# Patient Record
Sex: MALE | Race: WHITE | Employment: OTHER | URBAN - METROPOLITAN AREA
[De-identification: names, ages, dates, MRNs, and addresses within clinical notes are randomized per-mention and may not be internally consistent; named-entity substitution may affect disease eponyms.]

---

## 2022-01-28 LAB — SARS-COV-2: DETECTED

## 2022-01-29 ENCOUNTER — HOSPITAL ENCOUNTER (INPATIENT)
Age: 72
LOS: 10 days | Discharge: HOME | DRG: 871 | End: 2022-02-08
Attending: INTERNAL MEDICINE | Admitting: INTERNAL MEDICINE
Payer: MEDICARE

## 2022-01-29 PROBLEM — J12.82 PNEUMONIA DUE TO COVID-19 VIRUS: Status: ACTIVE | Noted: 2022-01-29

## 2022-01-29 PROBLEM — U07.1 PNEUMONIA DUE TO COVID-19 VIRUS: Status: ACTIVE | Noted: 2022-01-29

## 2022-01-29 LAB
ANION GAP SERPL CALCULATED.3IONS-SCNC: 13 MMOL/L (ref 3–16)
BASE EXCESS ARTERIAL: 0.9 MMOL/L (ref -3–3)
BUN BLDV-MCNC: 46 MG/DL (ref 7–20)
C-REACTIVE PROTEIN: 455.9 MG/L (ref 0–5.1)
CALCIUM SERPL-MCNC: 8.7 MG/DL (ref 8.3–10.6)
CARBOXYHEMOGLOBIN ARTERIAL: 0.3 % (ref 0–1.5)
CHLORIDE BLD-SCNC: 99 MMOL/L (ref 99–110)
CO2: 25 MMOL/L (ref 21–32)
CREAT SERPL-MCNC: 1.2 MG/DL (ref 0.8–1.3)
D DIMER: 1642 NG/ML DDU (ref 0–229)
GFR AFRICAN AMERICAN: >60
GFR NON-AFRICAN AMERICAN: 59
GLUCOSE BLD-MCNC: 123 MG/DL (ref 70–99)
HCO3 ARTERIAL: 26.9 MMOL/L (ref 21–29)
HCT VFR BLD CALC: 38.8 % (ref 40.5–52.5)
HEMOGLOBIN, ART, EXTENDED: 13.5 G/DL (ref 13.5–17.5)
HEMOGLOBIN: 12.9 G/DL (ref 13.5–17.5)
MCH RBC QN AUTO: 31.2 PG (ref 26–34)
MCHC RBC AUTO-ENTMCNC: 33.2 G/DL (ref 31–36)
MCV RBC AUTO: 93.9 FL (ref 80–100)
METHEMOGLOBIN ARTERIAL: 0.4 %
O2 SAT, ARTERIAL: 96.9 %
O2 THERAPY: ABNORMAL
PCO2 ARTERIAL: 46.8 MMHG (ref 35–45)
PDW BLD-RTO: 14.6 % (ref 12.4–15.4)
PH ARTERIAL: 7.37 (ref 7.35–7.45)
PLATELET # BLD: 150 K/UL (ref 135–450)
PMV BLD AUTO: 10.8 FL (ref 5–10.5)
PO2 ARTERIAL: 92 MMHG (ref 75–108)
POTASSIUM SERPL-SCNC: 4.2 MMOL/L (ref 3.5–5.1)
PROCALCITONIN: 9.8 NG/ML (ref 0–0.15)
RBC # BLD: 4.14 M/UL (ref 4.2–5.9)
SODIUM BLD-SCNC: 137 MMOL/L (ref 136–145)
TCO2 ARTERIAL: 63.4 MMOL/L
TROPONIN: <0.01 NG/ML
TROPONIN: <0.01 NG/ML
WBC # BLD: 13.9 K/UL (ref 4–11)

## 2022-01-29 PROCEDURE — 86140 C-REACTIVE PROTEIN: CPT

## 2022-01-29 PROCEDURE — 94761 N-INVAS EAR/PLS OXIMETRY MLT: CPT

## 2022-01-29 PROCEDURE — 36415 COLL VENOUS BLD VENIPUNCTURE: CPT

## 2022-01-29 PROCEDURE — 84484 ASSAY OF TROPONIN QUANT: CPT

## 2022-01-29 PROCEDURE — 85027 COMPLETE CBC AUTOMATED: CPT

## 2022-01-29 PROCEDURE — 99223 1ST HOSP IP/OBS HIGH 75: CPT | Performed by: INTERNAL MEDICINE

## 2022-01-29 PROCEDURE — XW033H5 INTRODUCTION OF TOCILIZUMAB INTO PERIPHERAL VEIN, PERCUTANEOUS APPROACH, NEW TECHNOLOGY GROUP 5: ICD-10-PCS | Performed by: INTERNAL MEDICINE

## 2022-01-29 PROCEDURE — 80048 BASIC METABOLIC PNL TOTAL CA: CPT

## 2022-01-29 PROCEDURE — 2700000000 HC OXYGEN THERAPY PER DAY

## 2022-01-29 PROCEDURE — 85379 FIBRIN DEGRADATION QUANT: CPT

## 2022-01-29 PROCEDURE — 6360000002 HC RX W HCPCS: Performed by: INTERNAL MEDICINE

## 2022-01-29 PROCEDURE — 94640 AIRWAY INHALATION TREATMENT: CPT

## 2022-01-29 PROCEDURE — 84145 PROCALCITONIN (PCT): CPT

## 2022-01-29 PROCEDURE — 2580000003 HC RX 258: Performed by: INTERNAL MEDICINE

## 2022-01-29 PROCEDURE — 2000000000 HC ICU R&B

## 2022-01-29 PROCEDURE — 2500000003 HC RX 250 WO HCPCS: Performed by: INTERNAL MEDICINE

## 2022-01-29 PROCEDURE — 82803 BLOOD GASES ANY COMBINATION: CPT

## 2022-01-29 PROCEDURE — 6370000000 HC RX 637 (ALT 250 FOR IP): Performed by: INTERNAL MEDICINE

## 2022-01-29 PROCEDURE — 51702 INSERT TEMP BLADDER CATH: CPT

## 2022-01-29 RX ORDER — ACETAMINOPHEN 325 MG/1
650 TABLET ORAL EVERY 6 HOURS PRN
Status: DISCONTINUED | OUTPATIENT
Start: 2022-01-29 | End: 2022-02-08 | Stop reason: HOSPADM

## 2022-01-29 RX ORDER — ONDANSETRON 4 MG/1
4 TABLET, ORALLY DISINTEGRATING ORAL EVERY 8 HOURS PRN
Status: DISCONTINUED | OUTPATIENT
Start: 2022-01-29 | End: 2022-02-08 | Stop reason: HOSPADM

## 2022-01-29 RX ORDER — ALBUTEROL SULFATE 2.5 MG/3ML
2.5 SOLUTION RESPIRATORY (INHALATION) EVERY 4 HOURS PRN
Status: DISCONTINUED | OUTPATIENT
Start: 2022-01-29 | End: 2022-02-08 | Stop reason: HOSPADM

## 2022-01-29 RX ORDER — ALBUTEROL SULFATE 2.5 MG/3ML
2.5 SOLUTION RESPIRATORY (INHALATION) EVERY 4 HOURS
Status: DISCONTINUED | OUTPATIENT
Start: 2022-01-29 | End: 2022-01-29

## 2022-01-29 RX ORDER — DEXMEDETOMIDINE HYDROCHLORIDE 4 UG/ML
.2-1.4 INJECTION, SOLUTION INTRAVENOUS CONTINUOUS
Status: DISCONTINUED | OUTPATIENT
Start: 2022-01-29 | End: 2022-02-05

## 2022-01-29 RX ORDER — SODIUM CHLORIDE 0.9 % (FLUSH) 0.9 %
5-40 SYRINGE (ML) INJECTION PRN
Status: DISCONTINUED | OUTPATIENT
Start: 2022-01-29 | End: 2022-02-08 | Stop reason: HOSPADM

## 2022-01-29 RX ORDER — POLYETHYLENE GLYCOL 3350 17 G/17G
17 POWDER, FOR SOLUTION ORAL DAILY PRN
Status: DISCONTINUED | OUTPATIENT
Start: 2022-01-29 | End: 2022-02-08 | Stop reason: HOSPADM

## 2022-01-29 RX ORDER — ONDANSETRON 2 MG/ML
4 INJECTION INTRAMUSCULAR; INTRAVENOUS EVERY 6 HOURS PRN
Status: DISCONTINUED | OUTPATIENT
Start: 2022-01-29 | End: 2022-02-08 | Stop reason: HOSPADM

## 2022-01-29 RX ORDER — ACETAMINOPHEN 650 MG/1
650 SUPPOSITORY RECTAL EVERY 6 HOURS PRN
Status: DISCONTINUED | OUTPATIENT
Start: 2022-01-29 | End: 2022-02-08 | Stop reason: HOSPADM

## 2022-01-29 RX ORDER — ALBUTEROL SULFATE 90 UG/1
2 AEROSOL, METERED RESPIRATORY (INHALATION) EVERY 4 HOURS
Status: DISCONTINUED | OUTPATIENT
Start: 2022-01-29 | End: 2022-01-29

## 2022-01-29 RX ORDER — SODIUM CHLORIDE 0.9 % (FLUSH) 0.9 %
5-40 SYRINGE (ML) INJECTION EVERY 12 HOURS SCHEDULED
Status: DISCONTINUED | OUTPATIENT
Start: 2022-01-29 | End: 2022-02-08 | Stop reason: HOSPADM

## 2022-01-29 RX ORDER — SODIUM CHLORIDE 9 MG/ML
25 INJECTION, SOLUTION INTRAVENOUS PRN
Status: DISCONTINUED | OUTPATIENT
Start: 2022-01-29 | End: 2022-02-08 | Stop reason: HOSPADM

## 2022-01-29 RX ORDER — ALBUTEROL SULFATE 90 UG/1
2 AEROSOL, METERED RESPIRATORY (INHALATION) 4 TIMES DAILY
Status: DISCONTINUED | OUTPATIENT
Start: 2022-01-30 | End: 2022-02-02

## 2022-01-29 RX ADMIN — TOCILIZUMAB 800 MG: 20 INJECTION, SOLUTION, CONCENTRATE INTRAVENOUS at 17:00

## 2022-01-29 RX ADMIN — Medication 2 PUFF: at 16:15

## 2022-01-29 RX ADMIN — DEXAMETHASONE SODIUM PHOSPHATE 20 MG: 4 INJECTION, SOLUTION INTRA-ARTICULAR; INTRALESIONAL; INTRAMUSCULAR; INTRAVENOUS; SOFT TISSUE at 12:38

## 2022-01-29 RX ADMIN — Medication 10 ML: at 20:19

## 2022-01-29 RX ADMIN — SODIUM CHLORIDE 25 ML: 9 INJECTION, SOLUTION INTRAVENOUS at 12:33

## 2022-01-29 RX ADMIN — DEXMEDETOMIDINE HYDROCHLORIDE 0.2 MCG/KG/HR: 4 INJECTION, SOLUTION INTRAVENOUS at 20:16

## 2022-01-29 RX ADMIN — ENOXAPARIN SODIUM 100 MG: 100 INJECTION SUBCUTANEOUS at 16:41

## 2022-01-29 NOTE — PLAN OF CARE
Pending transfer note (patient coming from [facility] ER):    54-year-old male with history of COPD, morbid obesity due to excess calories, who presents to the emergency room with complaints of cough, shortness of breath, ongoing for the past 2 to 3 days. Patient tested positive for COVID-19 in the emergency room. Chest x-ray reveals bilateral infiltrates. He is hypoxic, currently requiring high flow 15 L/min supplemental oxygen. Per report obtained from the emergency room, patient did not tolerate BiPAP. His current saturation is around 90%. He has leukocytosis of 11,800, BUN of 40, creatinine 1.7. Initial troponin 0.044, repeat 0.02 (which is normal based on lab parameters at [facility]). Arterial blood gas with pH of 7.35, PCO2 45. In the emergency room, patient received a dose of IV Levaquin, IV Decadron, which was followed by IV Solu-Medrol. Patient is being transferred to [facility] for management of COVID-19 pneumonia, acute respiratory failure with hypoxia, acute kidney injury. Patient will be admitted to ICU. SIGNED: Kyle Martinez MD, MPH . 1/29/2022       Update:  D-dimer was elevated and 1 full dose SC lovenox given. Did not have CT-PE due to poor renal function.

## 2022-01-29 NOTE — CONSULTS
[facility] Pulmonary and Critical Care   Consult Note      Reason for Consult: Acute hypoxemic respiratory failure, COVID-19 pneumonia  Requesting Physician: Dr. Ramiro Thomas  Subjective:   [facility]:                The patient is a 66 y.o. male with significant past medical history of:  History reviewed. No pertinent past medical history. Patient transferred here from [facility] emergency room after presenting with a chief complaint of increasingly severe shortness of breath over the preceding 2 to 3 days. He also complained of moderate associated cough. Exertion is a modifying factor as his saturations fall even when he moves about in the bed. Patient is tested positive for COVID-19. Hypoxemic respiratory failure has progressed to the point that he is now on heated high flow oxygen. He was given Levaquin and Decadron in the emergency department prior to being transferred here. Past Surgical History:    History reviewed. No pertinent surgical history.   Current Medications:    Current Facility-Administered Medications: sodium chloride flush 0.9 % injection 5-40 mL, 5-40 mL, IntraVENous, 2 times per day  sodium chloride flush 0.9 % injection 5-40 mL, 5-40 mL, IntraVENous, PRN  0.9 % sodium chloride infusion, 25 mL, IntraVENous, PRN  enoxaparin (LOVENOX) injection 100 mg, 100 mg, SubCUTAneous, BID  ondansetron (ZOFRAN-ODT) disintegrating tablet 4 mg, 4 mg, Oral, Q8H PRN **OR** ondansetron (ZOFRAN) injection 4 mg, 4 mg, IntraVENous, Q6H PRN  polyethylene glycol (GLYCOLAX) packet 17 g, 17 g, Oral, Daily PRN  acetaminophen (TYLENOL) tablet 650 mg, 650 mg, Oral, Q6H PRN **OR** acetaminophen (TYLENOL) suppository 650 mg, 650 mg, Rectal, Q6H PRN  dexamethasone (DECADRON) 20 mg in sodium chloride 0.9 % IVPB, 20 mg, IntraVENous, Daily  albuterol sulfate  (90 Base) MCG/ACT inhaler 2 puff, 2 puff, Inhalation, Q4H  albuterol (PROVENTIL) nebulizer solution 2.5 mg, 2.5 mg, Nebulization, Q4H PRN    Not on File    Social History:    TOBACCO:   has no history on file for tobacco use. ETOH:   has no history on file for alcohol use. Patient currently lives independently  Environmental/chemical exposure: None known    Family History:   History reviewed. No pertinent family history. REVIEW OF SYSTEMS:    No review of systems due to patient factors    Objective:   PHYSICAL EXAM:      VITALS:  /76   Pulse 73 Comment: SR  Temp 97.2 °F (36.2 °C) (Temporal)   Resp 23   Ht 6' 2\" (1.88 m)   Wt 233 lb (105.7 kg)   BMI 29.92 kg/m²      24HR INTAKE/OUTPUT:    Intake/Output Summary (Last 24 hours) at 1/29/2022 1258  Last data filed at 1/29/2022 1214  Gross per 24 hour   Intake --   Output 650 ml   Net -650 ml     CONSTITUTIONAL: He is awake and cooperative but really not giving much in the way of historical detail   NECK:  Supple, symmetrical, trachea midline, no adenopathy, thyroid symmetric, not enlarged and no tenderness, skin normal  LUNGS:  no increased work of breathing and clear to auscultation. No accessory muscle use  CARDIOVASCULAR: S1 and S2, no edema and no JVD  ABDOMEN:  normal bowel sounds, non-distended and no masses palpated, and no tenderness to palpation. No hepatospleenomegaly  LYMPHADENOPATHY:  no axillary or supraclavicular adenopathy. No cervical adnenopathy  PSYCHIATRIC: Alert and oriented  MUSCULOSKELETAL: No obvious misalignment or effusion of the joints. No clubbing, cyanosis of the digits. SKIN:  normal skin color, texture, turgor and no redness, warmth, or swelling. No palpable nodules    DATA:    Old records have been reviewed    CBC:  No results for input(s): WBC, RBC, HGB, HCT, PLT, MCV, MCH, MCHC, RDW, NRBC, SEGSPCT, BANDSPCT in the last 72 hours. BMP:  No results for input(s): NA, K, CL, CO2, BUN, CREATININE, CALCIUM, GLUCOSE in the last 72 hours.    ABG:  Recent Labs     01/29/22  1138   PHART 7.367   BKN1PCV 46.8*   PO2ART 92.0   EKH0MBY 26.9   H7KPDLNI 96.9   BEART 0.9 Procalcitonin  Recent Labs     01/29/22  1159   PROCAL 9.80*       No results found for: BNP  Lab Results   Component Value Date    TROPONINI <0.01 01/29/2022           Radiology Review:  All pertinent images / reports were reviewed as a part of this visit. Assessment:     Acute hypoxemic respiratory failure  COVID-19 pneumonia    Plan:     Patient has just arrived and labs and x-rays from here are still pending. He is in mild respiratory distress on heated high flow oxygen 95% / 50 L/min  Saturations are in the high 80s and low 90s. He is at risk for progression to requiring endotracheal intubation and mechanical ventilation.   Put him on Decadron 20 mg daily  We will give him a dose of Actemra  Follow-up on lab

## 2022-01-29 NOTE — CARE COORDINATION
Discharge planning note:    Chart reviewed and it appears that patient has minimal needs for discharge at this time. Discussed with patients nurse and requested that case management be notified if discharge needs are identified. Patient was transferred from [facility] ER. He is from home with spouse. Currently requiring Airvo at 45% & IV Decadron    Financial counseling referral placed d/t no insurance listed. 7% Readmission risk. Case management will continue to follow progress and update discharge plan as needed.       Kelly Espinoza MSN, BSN, RN  Case Management   [phone]

## 2022-01-29 NOTE — H&P
Hospital Medicine History & Physical      PCP: Kem Tamez MD    Date of Admission: 1/29/2022    Date of Service: Pt seen/examined on 1/29/2022 and Admitted to Inpatient . Chief Complaint: Short of breath      History Of Present Illness:  Patient is a 71-year-old  male presents from [facility] urgency department. He presented to the emergency department with 2 to 3 days of worsening shortness of breath. Currently he was tested for Covid in the ER there and to test positive. Chest x-ray reveals bilateral infiltrates. He is hypoxic, was requiring high flow 15 L/min supplemental oxygen. Per report obtained from the emergency room, patient did not tolerate BiPAP. His current saturation is around 90%. At the time of my evaluation the patient is on air Vo as well as nonrebreather. Past Medical History:    Emphysema, extrinsic asthma, hypertension, esophageal reflux disease,  Past Surgical History:    Abdominal surgery, finger surgery    Medications Prior to Admission:    Theophylline 200 mg tablets take 100 mg by mouth daily Diovan 320 mg by mouth daily, Norvasc 5 mg by mouth daily, omeprazole 20 mg by mouth daily, Percocet 5/3/2025 1 tab every 4 hours as needed for pain prescribed and 2015 it was last prescribed in April 2021    Allergies:  Patient has no known drug allergies. Social History:  The patient currently lives at home    TOBACCO: Patient is an every day smoker and smokes 2 packs a day he has done so for 48 years  ETOH: Patient also does drink records indicate he drinks 12 standard drinks a week. Family History:  Reviewed in detail and negative for DM, Early CAD, Cancer, CVA. Positive as follows:    History reviewed. No pertinent family history.     REVIEW OF SYSTEMS:     The patient is encephalopathic and not able to answer questions appropriately at this time. PHYSICAL EXAM:    /69   Pulse 68   Temp 97.2 °F (36.2 °C) (Temporal)   Resp 20   Ht 6' 2\" (1.88 m)   Wt 233 lb (105.7 kg)   SpO2 92%   BMI 29.92 kg/m²     General appearance: He is in mild respiratory distress but appears comfortable on his air Vo as well as the nonrebreather. HEENT Normal cephalic, atraumatic without obvious deformity. Pupils equal, round, and reactive to light. Extra ocular muscles intact. Conjunctivae/corneas clear. Neck: Supple, No jugular venous distention/bruits. Trachea midline without thyromegaly or adenopathy with full range of motion. Lungs: He has rales at the bases bilaterally but is moving air okay no wheeze or rhonchi are auscultated. Heart: Regular rate and rhythm with Normal S1/S2 without murmurs, rubs or gallops, point of maximum impulse non-displaced  Abdomen: Soft, non-tender or non-distended without rigidity or guarding and positive bowel sounds all four quadrants. Extremities: No clubbing, cyanosis, or edema bilaterally. Full range of motion without deformity and normal gait intact. Skin: Skin color, texture, turgor normal.  No rashes or lesions. Neurologic: Alert and oriented X 3, neurovascularly intact with sensory/motor intact upper extremities/lower extremities, bilaterally. Cranial nerves: II-XII intact, grossly non-focal.  Mental status: Alert, oriented, thought content appropriate. Capillary Refill: Acceptable  < 3 seconds  Peripheral Pulses: +3 Easily felt, not easily obliterated with pressure      CXR:  I have reviewed the CXR with the following interpretation: Bilateral pneumonia based on his read from [facility] do not have any current x-rays on him here.   X-rays are ordered for tomorrow  EKG:  I have reviewed the EKG with the following interpretation: Normal sinus rhythm      Recent Labs     01/29/22  1159   TROPONINI <0.01     CRP is 455.9  His procalcitonin is 9.80   D-dimer is 1642  His blood gas reveals a pH of 7.367 with a PCO2 of 46.8 PO2 was 92.0 bicarb is 26.9 total CO2 was 63.4 base excess 0.9% saturation is 96.9  U/A:  No results found for: NITRITE, COLORU, WBCUA, RBCUA, MUCUS, BACTERIA, CLARITYU, SPECGRAV, LEUKOCYTESUR, BLOODU, GLUCOSEU, AMORPHOUS    ABG    Lab Results   Component Value Date    IZJ2IEL 26.9 01/29/2022    BEART 0.9 01/29/2022    Q7JYXCUA 96.9 01/29/2022    PHART 7.367 01/29/2022    DSD2RZE 46.8 01/29/2022    PO2ART 92.0 01/29/2022    XFP4QQC 63.4 01/29/2022           Active Hospital Problems    Diagnosis Date Noted    Pneumonia due to COVID-19 virus [U07.1, J12.82] 01/29/2022         PHYSICIANS CERTIFICATION:    I certify that Fransico Leal is expected to be hospitalized for greater  than 2 midnights based on the following assessment and plan:      ASSESSMENT/PLAN:    Acute respiratory failure  -Secondary to COVID-19 pneumonia as well as a component of COPD and asthma.  -Patient has nebulizers available he has been started on Decadron  -His CRP is elevated and he can likely receive a dose of Actemra versus starting on baricitinib. -As far as I can tell he is unvaccinated however he has not been able to answer my questions and I cannot find any record of his utilization in the chart. COVID-19 pneumonia  -Treatment as noted above  -Consider adding Actemra    Probable secondary infection.  -Patient's procalcitonin is 9.8 based on this we will cover him with empiric antibiotics  -I will start the patient on Zosyn    Tobacco abuse  -We will start the patient on a nicotine patch    Elevated D-dimer  -Patient will be on therapeutic Lovenox      DVT Prophylaxis: Lovenox  Diet: ADULT DIET; Regular  Code Status: Full Code  PT/OT Eval Status: Eval and treat when appropriate    Dispo -patient is critically ill with COVID-19 pneumonia he is currently requiring max air Vo as well as nonrebreather at this time to keep his sats in the 90s.   He seems confused able to answer questions appropriately but is able to answer some so he may be improving in regard to his oxygenation. He is admitted to our [facility] ICU. Medical decision making remains high approximately 40 minutes of critical care time spent. Ebony Gonzalez MD    Thank you Dony Richard MD for the opportunity to be involved in this patient's care. If you have any questions or concerns please feel free to contact me at [phone].

## 2022-01-29 NOTE — RT PROTOCOL NOTE
RT Inhaler-Nebulizer Bronchodilator Protocol Note    There is a bronchodilator order in the chart from a provider indicating to follow the RT Bronchodilator Protocol and there is an Initiate RT Inhaler-Nebulizer Bronchodilator Protocol order as well (see protocol at bottom of note). CXR Findings:  No results found. The findings from the last RT Protocol Assessment were as follows:   History Pulmonary Disease: Chronic pulmonary disease  Respiratory Pattern: Dyspnea on exertion or RR 21-25 bpm  Breath Sounds: Inspiratory and expiratory or bilateral wheezing and/or rhonchi  Cough: Strong, spontaneous, non-productive  Indication for Bronchodilator Therapy: Wheezing associated with pulm disorder  Bronchodilator Assessment Score: 10    Aerosolized bronchodilator medication orders have been revised according to the RT Inhaler-Nebulizer Bronchodilator Protocol below. Respiratory Therapist to perform RT Therapy Protocol Assessment initially then follow the protocol. Repeat RT Therapy Protocol Assessment PRN for score 0-3 or on second treatment, BID, and PRN for scores above 3. No Indications - adjust the frequency to every 6 hours PRN wheezing or bronchospasm, if no treatments needed after 48 hours then discontinue using Per Protocol order mode. If indication present, adjust the RT bronchodilator orders based on the Bronchodilator Assessment Score as indicated below. Use Inhaler orders unless patient has one or more of the following: on home nebulizer, not able to hold breath for 10 seconds, is not alert and oriented, cannot activate and use MDI correctly, or respiratory rate 25 breaths per minute or more, then use the equivalent nebulizer order(s) with same Frequency and PRN reasons based on the score. If a patient is on this medication at home then do not decrease Frequency below that used at home.     0-3 - enter or revise RT bronchodilator order(s) to equivalent RT Bronchodilator order with Frequency of every 4 hours PRN for wheezing or increased work of breathing using Per Protocol order mode. 4-6 - enter or revise RT Bronchodilator order(s) to two equivalent RT bronchodilator orders with one order with BID Frequency and one order with Frequency of every 4 hours PRN wheezing or increased work of breathing using Per Protocol order mode. 7-10 - enter or revise RT Bronchodilator order(s) to two equivalent RT bronchodilator orders with one order with TID Frequency and one order with Frequency of every 4 hours PRN wheezing or increased work of breathing using Per Protocol order mode. 11-13 - enter or revise RT Bronchodilator order(s) to one equivalent RT bronchodilator order with QID Frequency and an Albuterol order with Frequency of every 4 hours PRN wheezing or increased work of breathing using Per Protocol order mode. Greater than 13 - enter or revise RT Bronchodilator order(s) to one equivalent RT bronchodilator order with every 4 hours Frequency and an Albuterol order with Frequency of every 2 hours PRN wheezing or increased work of breathing using Per Protocol order mode. RT to enter RT Home Evaluation for COPD & MDI Assessment order using Per Protocol order mode.     Electronically signed by Jeanette Ruelas RCP on 1/29/2022 at 5:28 PM

## 2022-01-29 NOTE — PROGRESS NOTES
01/29/22 1727   RT Protocol   History Pulmonary Disease 2   Respiratory pattern 2   Breath sounds 6   Cough 0   Indications for Bronchodilator Therapy Wheezing associated with pulm disorder   Bronchodilator Assessment Score 10

## 2022-01-30 ENCOUNTER — APPOINTMENT (OUTPATIENT)
Dept: GENERAL RADIOLOGY | Age: 72
DRG: 871 | End: 2022-01-30
Attending: INTERNAL MEDICINE
Payer: MEDICARE

## 2022-01-30 LAB
A/G RATIO: 1 (ref 1.1–2.2)
ALBUMIN SERPL-MCNC: 2.7 G/DL (ref 3.4–5)
ALP BLD-CCNC: 69 U/L (ref 40–129)
ALT SERPL-CCNC: 25 U/L (ref 10–40)
ANION GAP SERPL CALCULATED.3IONS-SCNC: 12 MMOL/L (ref 3–16)
AST SERPL-CCNC: 33 U/L (ref 15–37)
BILIRUB SERPL-MCNC: 0.8 MG/DL (ref 0–1)
BUN BLDV-MCNC: 51 MG/DL (ref 7–20)
CALCIUM SERPL-MCNC: 9 MG/DL (ref 8.3–10.6)
CHLORIDE BLD-SCNC: 102 MMOL/L (ref 99–110)
CO2: 24 MMOL/L (ref 21–32)
CREAT SERPL-MCNC: 1 MG/DL (ref 0.8–1.3)
GFR AFRICAN AMERICAN: >60
GFR NON-AFRICAN AMERICAN: >60
GLUCOSE BLD-MCNC: 146 MG/DL (ref 70–99)
HCT VFR BLD CALC: 38.9 % (ref 40.5–52.5)
HEMOGLOBIN: 13.1 G/DL (ref 13.5–17.5)
MCH RBC QN AUTO: 31.4 PG (ref 26–34)
MCHC RBC AUTO-ENTMCNC: 33.6 G/DL (ref 31–36)
MCV RBC AUTO: 93.5 FL (ref 80–100)
PDW BLD-RTO: 15 % (ref 12.4–15.4)
PLATELET # BLD: 164 K/UL (ref 135–450)
PMV BLD AUTO: 10.5 FL (ref 5–10.5)
POTASSIUM REFLEX MAGNESIUM: 4.6 MMOL/L (ref 3.5–5.1)
RBC # BLD: 4.16 M/UL (ref 4.2–5.9)
SODIUM BLD-SCNC: 138 MMOL/L (ref 136–145)
TOTAL PROTEIN: 5.5 G/DL (ref 6.4–8.2)
WBC # BLD: 12.6 K/UL (ref 4–11)

## 2022-01-30 PROCEDURE — 6370000000 HC RX 637 (ALT 250 FOR IP): Performed by: INTERNAL MEDICINE

## 2022-01-30 PROCEDURE — 87449 NOS EACH ORGANISM AG IA: CPT

## 2022-01-30 PROCEDURE — 85027 COMPLETE CBC AUTOMATED: CPT

## 2022-01-30 PROCEDURE — 71045 X-RAY EXAM CHEST 1 VIEW: CPT

## 2022-01-30 PROCEDURE — 6360000002 HC RX W HCPCS: Performed by: INTERNAL MEDICINE

## 2022-01-30 PROCEDURE — 36415 COLL VENOUS BLD VENIPUNCTURE: CPT

## 2022-01-30 PROCEDURE — 94660 CPAP INITIATION&MGMT: CPT

## 2022-01-30 PROCEDURE — 99233 SBSQ HOSP IP/OBS HIGH 50: CPT | Performed by: INTERNAL MEDICINE

## 2022-01-30 PROCEDURE — 2700000000 HC OXYGEN THERAPY PER DAY

## 2022-01-30 PROCEDURE — 2000000000 HC ICU R&B

## 2022-01-30 PROCEDURE — 94640 AIRWAY INHALATION TREATMENT: CPT

## 2022-01-30 PROCEDURE — 87641 MR-STAPH DNA AMP PROBE: CPT

## 2022-01-30 PROCEDURE — 2580000003 HC RX 258: Performed by: INTERNAL MEDICINE

## 2022-01-30 PROCEDURE — 80053 COMPREHEN METABOLIC PANEL: CPT

## 2022-01-30 PROCEDURE — 94761 N-INVAS EAR/PLS OXIMETRY MLT: CPT

## 2022-01-30 PROCEDURE — 87040 BLOOD CULTURE FOR BACTERIA: CPT

## 2022-01-30 RX ADMIN — PIPERACILLIN AND TAZOBACTAM 3375 MG: 3; .375 INJECTION, POWDER, LYOPHILIZED, FOR SOLUTION INTRAVENOUS at 10:58

## 2022-01-30 RX ADMIN — Medication 2 PUFF: at 12:52

## 2022-01-30 RX ADMIN — Medication 10 ML: at 10:59

## 2022-01-30 RX ADMIN — SODIUM CHLORIDE 25 ML: 9 INJECTION, SOLUTION INTRAVENOUS at 11:46

## 2022-01-30 RX ADMIN — ENOXAPARIN SODIUM 100 MG: 100 INJECTION SUBCUTANEOUS at 19:35

## 2022-01-30 RX ADMIN — Medication 2 PUFF: at 08:48

## 2022-01-30 RX ADMIN — DEXAMETHASONE SODIUM PHOSPHATE 20 MG: 4 INJECTION, SOLUTION INTRA-ARTICULAR; INTRALESIONAL; INTRAMUSCULAR; INTRAVENOUS; SOFT TISSUE at 11:49

## 2022-01-30 RX ADMIN — ENOXAPARIN SODIUM 100 MG: 100 INJECTION SUBCUTANEOUS at 10:58

## 2022-01-30 RX ADMIN — Medication 10 ML: at 19:35

## 2022-01-30 RX ADMIN — Medication 2 PUFF: at 15:44

## 2022-01-30 RX ADMIN — Medication 2 PUFF: at 19:58

## 2022-01-30 RX ADMIN — PIPERACILLIN AND TAZOBACTAM 3375 MG: 3; .375 INJECTION, POWDER, LYOPHILIZED, FOR SOLUTION INTRAVENOUS at 17:29

## 2022-01-30 ASSESSMENT — PAIN SCALES - GENERAL
PAINLEVEL_OUTOF10: 0
PAINLEVEL_OUTOF10: 0

## 2022-01-30 NOTE — PROGRESS NOTES
Placed patient on BiPAP due to increased WOB and SpO2 87% while on 15 Lpm high flow nasal cannula and 15 Lpm NRB. Patient is tolerating well with SpO2 91% on 12/6 and 100% FiO2.

## 2022-01-30 NOTE — PROGRESS NOTES
Hospitalist Progress Note      PCP: Guillermina Patton MD    Date of Admission: 1/29/2022    Chief Complaint:  SOB    Hospital Course: Patient is a 69-year-old  male presents from [facility] urgency department. He presented to the emergency department with 2 to 3 days of worsening shortness of breath. Currently he was tested for Covid in the ER there and to test positive.   Chest x-ray reveals bilateral infiltrates.  He is hypoxic, was requiring high flow 15 L/min supplemental oxygen.  Per report obtained from the emergency room, patient did not tolerate BiPAP.  His current saturation is around 90%. At the time of my     Subjective:   Remains on AirVo and       Medications:  Reviewed    Infusion Medications    sodium chloride 25 mL (01/30/22 1146)    dexmedetomidine Stopped (01/30/22 0931)     Scheduled Medications    piperacillin-tazobactam  3,375 mg IntraVENous Q8H    sodium chloride flush  5-40 mL IntraVENous 2 times per day    enoxaparin  100 mg SubCUTAneous BID    dexamethasone  20 mg IntraVENous Daily    albuterol sulfate HFA  2 puff Inhalation 4x daily     PRN Meds: sodium chloride flush, sodium chloride, ondansetron **OR** ondansetron, polyethylene glycol, acetaminophen **OR** acetaminophen, albuterol      Intake/Output Summary (Last 24 hours) at 1/30/2022 1432  Last data filed at 1/30/2022 1303  Gross per 24 hour   Intake 76 ml   Output 1425 ml   Net -1349 ml       Physical Exam Performed:    /75   Pulse 64   Temp 96.6 °F (35.9 °C) (Temporal)   Resp 28   Ht 6' 2\" (1.88 m)   Wt 237 lb 8 oz (107.7 kg)   SpO2 96%   BMI 30.49 kg/m²     General appearance: He is in mild respiratory distress but appears comfortable on his air Vo as well as the nonrebreather. HEENT Normal cephalic, atraumatic without obvious deformity. Pupils equal, round, and reactive to light. Extra ocular muscles intact. Conjunctivae/corneas clear.   Neck: Supple, No jugular venous distention/bruits. Trachea midline without thyromegaly or adenopathy with full range of motion. Lungs: He has rales at the bases bilaterally but is moving air okay no wheeze or rhonchi are auscultated. Heart: Regular rate and rhythm with Normal S1/S2 without murmurs, rubs or gallops, point of maximum impulse non-displaced  Abdomen: Soft, non-tender or non-distended without rigidity or guarding and positive bowel sounds all four quadrants. Extremities: No clubbing, cyanosis, or edema bilaterally. Full range of motion without deformity and normal gait intact. Skin: Skin color, texture, turgor normal.  No rashes or lesions. Neurologic: Alert and oriented X 3, neurovascularly intact with sensory/motor intact upper extremities/lower extremities, bilaterally. Cranial nerves: II-XII intact, grossly non-focal.  Mental status: Alert, oriented, thought content appropriate. Capillary Refill: Acceptable  < 3 seconds  Peripheral Pulses: +3 Easily felt, not easily obliterated with pressure    Labs:   Recent Labs     01/29/22  1559 01/30/22  0435   WBC 13.9* 12.6*   HGB 12.9* 13.1*   HCT 38.8* 38.9*    164     Recent Labs     01/29/22  1558 01/30/22  0435    138   K 4.2 4.6   CL 99 102   CO2 25 24   BUN 46* 51*   CREATININE 1.2 1.0   CALCIUM 8.7 9.0     Recent Labs     01/30/22  0435   AST 33   ALT 25   BILITOT 0.8   ALKPHOS 69     No results for input(s): INR in the last 72 hours. Recent Labs     01/29/22  1159 01/29/22  1559   TROPONINI <0.01 <0.01       Urinalysis:    No results found for: Sushma Laundry, BACTERIA, RBCUA, BLOODU, SPECGRAV, GLUCOSEU    Radiology:  XR CHEST PORTABLE   Final Result   Areas of dense airspace opacity involving right mid to lower lung zones and   left lower lung zone concerning for multifocal pneumonia. There may also be   some superimposed small bilateral pleural effusions.          XR CHEST PORTABLE    (Results Pending)           Assessment/Plan:    Active Hospital Problems Diagnosis     Pneumonia due to COVID-19 virus [U07.1, J12.82]      Acute respiratory failure  -Secondary to COVID-19 pneumonia as well as a component of COPD and asthma.  -Patient has nebulizers available he has been started on Decadron  -His CRP is elevated and he can likely receive a dose of Actemra versus starting on baricitinib. -As far as I can tell he is unvaccinated however he has not been able to answer my questions and I cannot find any record of his utilization in the chart. He is on Airvo and Non-re-breather. He is in the high 80s he may need to go to BiPAP soon.      COVID-19 pneumonia  -Treatment as noted above  -Consider adding Actemra     Probable secondary infection.  -Patient's procalcitonin is 9.8 based on this we will cover him with empiric antibiotics  -I will start the patient on Zosyn     Tobacco abuse  -We will start the patient on a nicotine patch     Elevated D-dimer  -Patient will be on therapeutic Lovenox       DVT Prophylaxis: lovenox  Diet: ADULT DIET; Regular  Code Status: Full Code    PT/OT Eval Status: eval and treat     Dispo - -patient is critically ill with COVID-19 pneumonia he is currently requiring max air Vo as well as nonrebreather at this time to keep his sats in the 90s. He seems confused able to answer questions appropriately but is able to answer some so he may be improving in regard to his oxygenation. He is admitted to our [facility].   Medical decision making remains high approximately 40 minutes of critical care time spent.       Tarah Sanchez MD

## 2022-01-30 NOTE — PROGRESS NOTES
Patient is resting comfortably on BiPAP with SpO2 92%. There are no skin integrity issues at this time.

## 2022-01-30 NOTE — PROGRESS NOTES
[facility], [place] Pulmonary and Critical Care  Progress note      Reason for Consult: Acute hypoxemic respiratory failure, COVID-19 pneumonia  Requesting Physician: Dr. Erik Leyva  Subjective:   [facility]:                The patient is a 66 y.o. male with significant past medical history of:  History reviewed. No pertinent past medical history. Interval history: Remains on heated high flow oxygen +100% nonrebreather facemask. Desaturates quickly with any exertion including moving in the bed. Past Surgical History:    History reviewed. No pertinent surgical history. Current Medications:    Current Facility-Administered Medications: piperacillin-tazobactam (ZOSYN) 3,375 mg in dextrose 5 % 50 mL IVPB extended infusion (mini-bag), 3,375 mg, IntraVENous, Q8H  sodium chloride flush 0.9 % injection 5-40 mL, 5-40 mL, IntraVENous, 2 times per day  sodium chloride flush 0.9 % injection 5-40 mL, 5-40 mL, IntraVENous, PRN  0.9 % sodium chloride infusion, 25 mL, IntraVENous, PRN  enoxaparin (LOVENOX) injection 100 mg, 100 mg, SubCUTAneous, BID  ondansetron (ZOFRAN-ODT) disintegrating tablet 4 mg, 4 mg, Oral, Q8H PRN **OR** ondansetron (ZOFRAN) injection 4 mg, 4 mg, IntraVENous, Q6H PRN  polyethylene glycol (GLYCOLAX) packet 17 g, 17 g, Oral, Daily PRN  acetaminophen (TYLENOL) tablet 650 mg, 650 mg, Oral, Q6H PRN **OR** acetaminophen (TYLENOL) suppository 650 mg, 650 mg, Rectal, Q6H PRN  dexamethasone (DECADRON) 20 mg in sodium chloride 0.9 % IVPB, 20 mg, IntraVENous, Daily  albuterol (PROVENTIL) nebulizer solution 2.5 mg, 2.5 mg, Nebulization, Q4H PRN  albuterol sulfate  (90 Base) MCG/ACT inhaler 2 puff, 2 puff, Inhalation, 4x daily  dexmedetomidine (PRECEDEX) 400 mcg in sodium chloride 0.9 % 100 mL infusion, 0.2-1.4 mcg/kg/hr, IntraVENous, Continuous    No Known Allergies    Social History:    TOBACCO:   has no history on file for tobacco use. ETOH:   has no history on file for alcohol use.   Patient currently lives independently  Environmental/chemical exposure: None known    Family History:   History reviewed. No pertinent family history. REVIEW OF SYSTEMS:    No review of systems due to patient factors    Objective:   PHYSICAL EXAM:      VITALS:  /75   Pulse 59   Temp 96.6 °F (35.9 °C) (Temporal)   Resp 24   Ht 6' 2\" (1.88 m)   Wt 237 lb 8 oz (107.7 kg)   SpO2 90%   BMI 30.49 kg/m²      24HR INTAKE/OUTPUT:      Intake/Output Summary (Last 24 hours) at 1/30/2022 0959  Last data filed at 1/30/2022 1853  Gross per 24 hour   Intake 76 ml   Output 1775 ml   Net -1699 ml     CONSTITUTIONAL: He is awake and cooperative but really not giving much in the way of historical detail   NECK:  Supple, symmetrical, trachea midline, no adenopathy, thyroid symmetric, not enlarged and no tenderness, skin normal  LUNGS:  no increased work of breathing and clear to auscultation. No accessory muscle use  CARDIOVASCULAR: S1 and S2, no edema and no JVD  ABDOMEN:  normal bowel sounds, non-distended and no masses palpated, and no tenderness to palpation. No hepatospleenomegaly  LYMPHADENOPATHY:  no axillary or supraclavicular adenopathy. No cervical adnenopathy  PSYCHIATRIC: Alert and oriented  MUSCULOSKELETAL: No obvious misalignment or effusion of the joints. No clubbing, cyanosis of the digits. SKIN:  normal skin color, texture, turgor and no redness, warmth, or swelling.  No palpable nodules    DATA:    Old records have been reviewed    CBC:  Recent Labs     01/29/22  1559 01/30/22  0435   WBC 13.9* 12.6*   RBC 4.14* 4.16*   HGB 12.9* 13.1*   HCT 38.8* 38.9*    164   MCV 93.9 93.5   MCH 31.2 31.4   MCHC 33.2 33.6   RDW 14.6 15.0      BMP:  Recent Labs     01/29/22  1558 01/30/22  0435    138   K 4.2 4.6   CL 99 102   CO2 25 24   BUN 46* 51*   CREATININE 1.2 1.0   CALCIUM 8.7 9.0   GLUCOSE 123* 146*      ABG:  Recent Labs     01/29/22  1138   PHART 7.367   [id number] 46.8*   PO2ART 92.0   [id number] 26.9   [id number] 96.9

## 2022-01-31 ENCOUNTER — APPOINTMENT (OUTPATIENT)
Dept: GENERAL RADIOLOGY | Age: 72
DRG: 871 | End: 2022-01-31
Attending: INTERNAL MEDICINE
Payer: MEDICARE

## 2022-01-31 LAB
ANION GAP SERPL CALCULATED.3IONS-SCNC: 11 MMOL/L (ref 3–16)
BUN BLDV-MCNC: 47 MG/DL (ref 7–20)
CALCIUM SERPL-MCNC: 9 MG/DL (ref 8.3–10.6)
CHLORIDE BLD-SCNC: 104 MMOL/L (ref 99–110)
CO2: 25 MMOL/L (ref 21–32)
CREAT SERPL-MCNC: 0.9 MG/DL (ref 0.8–1.3)
GFR AFRICAN AMERICAN: >60
GFR NON-AFRICAN AMERICAN: >60
GLUCOSE BLD-MCNC: 143 MG/DL (ref 70–99)
HCT VFR BLD CALC: 41.9 % (ref 40.5–52.5)
HEMOGLOBIN: 13.9 G/DL (ref 13.5–17.5)
L. PNEUMOPHILA SEROGP 1 UR AG: NORMAL
LACTIC ACID: 3 MMOL/L (ref 0.4–2)
MCH RBC QN AUTO: 31.2 PG (ref 26–34)
MCHC RBC AUTO-ENTMCNC: 33.3 G/DL (ref 31–36)
MCV RBC AUTO: 93.8 FL (ref 80–100)
MRSA SCREEN RT-PCR: NORMAL
PDW BLD-RTO: 15.1 % (ref 12.4–15.4)
PLATELET # BLD: 203 K/UL (ref 135–450)
PMV BLD AUTO: 10.4 FL (ref 5–10.5)
POTASSIUM SERPL-SCNC: 4.5 MMOL/L (ref 3.5–5.1)
PROCALCITONIN: 3.82 NG/ML (ref 0–0.15)
PROCALCITONIN: 4.36 NG/ML (ref 0–0.15)
RBC # BLD: 4.46 M/UL (ref 4.2–5.9)
SODIUM BLD-SCNC: 140 MMOL/L (ref 136–145)
STREP PNEUMONIAE ANTIGEN, URINE: NORMAL
WBC # BLD: 19.3 K/UL (ref 4–11)

## 2022-01-31 PROCEDURE — 94640 AIRWAY INHALATION TREATMENT: CPT

## 2022-01-31 PROCEDURE — 36415 COLL VENOUS BLD VENIPUNCTURE: CPT

## 2022-01-31 PROCEDURE — 83605 ASSAY OF LACTIC ACID: CPT

## 2022-01-31 PROCEDURE — 6360000002 HC RX W HCPCS: Performed by: INTERNAL MEDICINE

## 2022-01-31 PROCEDURE — 99233 SBSQ HOSP IP/OBS HIGH 50: CPT | Performed by: INTERNAL MEDICINE

## 2022-01-31 PROCEDURE — 2000000000 HC ICU R&B

## 2022-01-31 PROCEDURE — 80048 BASIC METABOLIC PNL TOTAL CA: CPT

## 2022-01-31 PROCEDURE — 71045 X-RAY EXAM CHEST 1 VIEW: CPT

## 2022-01-31 PROCEDURE — 2700000000 HC OXYGEN THERAPY PER DAY

## 2022-01-31 PROCEDURE — 2580000003 HC RX 258: Performed by: INTERNAL MEDICINE

## 2022-01-31 PROCEDURE — 84145 PROCALCITONIN (PCT): CPT

## 2022-01-31 PROCEDURE — 94761 N-INVAS EAR/PLS OXIMETRY MLT: CPT

## 2022-01-31 PROCEDURE — 85027 COMPLETE CBC AUTOMATED: CPT

## 2022-01-31 RX ORDER — LINEZOLID 2 MG/ML
600 INJECTION, SOLUTION INTRAVENOUS EVERY 12 HOURS
Status: DISCONTINUED | OUTPATIENT
Start: 2022-01-31 | End: 2022-01-31

## 2022-01-31 RX ADMIN — Medication 2 PUFF: at 16:53

## 2022-01-31 RX ADMIN — ENOXAPARIN SODIUM 100 MG: 100 INJECTION SUBCUTANEOUS at 20:42

## 2022-01-31 RX ADMIN — Medication 2 PUFF: at 08:12

## 2022-01-31 RX ADMIN — Medication 10 ML: at 20:43

## 2022-01-31 RX ADMIN — DEXAMETHASONE SODIUM PHOSPHATE 20 MG: 4 INJECTION, SOLUTION INTRA-ARTICULAR; INTRALESIONAL; INTRAMUSCULAR; INTRAVENOUS; SOFT TISSUE at 11:52

## 2022-01-31 RX ADMIN — Medication 2 PUFF: at 12:17

## 2022-01-31 RX ADMIN — Medication 10 ML: at 11:54

## 2022-01-31 RX ADMIN — Medication 2 PUFF: at 21:26

## 2022-01-31 RX ADMIN — PIPERACILLIN AND TAZOBACTAM 3375 MG: 3; .375 INJECTION, POWDER, LYOPHILIZED, FOR SOLUTION INTRAVENOUS at 19:04

## 2022-01-31 RX ADMIN — LINEZOLID 600 MG: 600 INJECTION, SOLUTION INTRAVENOUS at 13:02

## 2022-01-31 RX ADMIN — PIPERACILLIN AND TAZOBACTAM 3375 MG: 3; .375 INJECTION, POWDER, LYOPHILIZED, FOR SOLUTION INTRAVENOUS at 11:45

## 2022-01-31 RX ADMIN — ENOXAPARIN SODIUM 100 MG: 100 INJECTION SUBCUTANEOUS at 10:12

## 2022-01-31 RX ADMIN — PIPERACILLIN AND TAZOBACTAM 3375 MG: 3; .375 INJECTION, POWDER, LYOPHILIZED, FOR SOLUTION INTRAVENOUS at 01:52

## 2022-01-31 ASSESSMENT — PAIN SCALES - GENERAL
PAINLEVEL_OUTOF10: 0

## 2022-01-31 NOTE — PROGRESS NOTES
[facility] Pulmonary and Critical Care  Progress note      Subjective: Patient sitting in chair in no apparent respiratory distress. Remains intermittently agitated and uncooperative. Suffering with COVID-19 infection as well as bacterial pneumonia. Currently on AirVo at 60 L/min of flow 100% FiO2. Past Surgical History:    History reviewed. No pertinent surgical history. Current Medications:    Current Facility-Administered Medications: piperacillin-tazobactam (ZOSYN) 3,375 mg in dextrose 5 % 50 mL IVPB extended infusion (mini-bag), 3,375 mg, IntraVENous, Q8H  sodium chloride flush 0.9 % injection 5-40 mL, 5-40 mL, IntraVENous, 2 times per day  sodium chloride flush 0.9 % injection 5-40 mL, 5-40 mL, IntraVENous, PRN  0.9 % sodium chloride infusion, 25 mL, IntraVENous, PRN  enoxaparin (LOVENOX) injection 100 mg, 100 mg, SubCUTAneous, BID  ondansetron (ZOFRAN-ODT) disintegrating tablet 4 mg, 4 mg, Oral, Q8H PRN **OR** ondansetron (ZOFRAN) injection 4 mg, 4 mg, IntraVENous, Q6H PRN  polyethylene glycol (GLYCOLAX) packet 17 g, 17 g, Oral, Daily PRN  acetaminophen (TYLENOL) tablet 650 mg, 650 mg, Oral, Q6H PRN **OR** acetaminophen (TYLENOL) suppository 650 mg, 650 mg, Rectal, Q6H PRN  dexamethasone (DECADRON) 20 mg in sodium chloride 0.9 % IVPB, 20 mg, IntraVENous, Daily  albuterol (PROVENTIL) nebulizer solution 2.5 mg, 2.5 mg, Nebulization, Q4H PRN  albuterol sulfate  (90 Base) MCG/ACT inhaler 2 puff, 2 puff, Inhalation, 4x daily  dexmedetomidine (PRECEDEX) 400 mcg in sodium chloride 0.9 % 100 mL infusion, 0.2-1.4 mcg/kg/hr, IntraVENous, Continuous    No Known Allergies    Social History:    TOBACCO:   has no history on file for tobacco use. ETOH:   has no history on file for alcohol use. Patient currently lives independently  Environmental/chemical exposure: None known    Family History:   History reviewed. No pertinent family history.   REVIEW OF SYSTEMS:    No review of systems due to patient factors    Objective:   PHYSICAL EXAM:      VITALS:  /80   Pulse 86   Temp 97.8 °F (36.6 °C) (Temporal)   Resp 26   Ht 6' 2\" (1.88 m)   Wt 229 lb 8 oz (104.1 kg)   SpO2 94%   BMI 29.47 kg/m²      24HR INTAKE/OUTPUT:      Intake/Output Summary (Last 24 hours) at 1/31/2022 1457  Last data filed at 1/31/2022 1100  Gross per 24 hour   Intake 800 ml   Output 1750 ml   Net -950 ml     CONSTITUTIONAL: He is awake and alert. NECK:  Supple, symmetrical, trachea midline, no adenopathy, thyroid symmetric, not enlarged and no tenderness, skin normal  LUNGS: Right-sided coarse crackles heard. No wheezing heard. CARDIOVASCULAR: S1 and S2, no edema and no JVD  ABDOMEN:  normal bowel sounds, non-distended and no masses palpated, and no tenderness to palpation. No hepatospleenomegaly  LYMPHADENOPATHY:  no axillary or supraclavicular adenopathy. No cervical adnenopathy  PSYCHIATRIC: Alert and oriented  MUSCULOSKELETAL: No obvious misalignment or effusion of the joints. No clubbing, cyanosis of the digits. SKIN:  normal skin color, texture, turgor and no redness, warmth, or swelling.  No palpable nodules    DATA:    Old records have been reviewed    CBC:  Recent Labs     01/29/22  1559 01/30/22  0435 01/31/22  0237   WBC 13.9* 12.6* 19.3*   RBC 4.14* 4.16* 4.46   HGB 12.9* 13.1* 13.9   HCT 38.8* 38.9* 41.9    164 203   MCV 93.9 93.5 93.8   MCH 31.2 31.4 31.2   MCHC 33.2 33.6 33.3   RDW 14.6 15.0 15.1      BMP:  Recent Labs     01/29/22  1558 01/30/22  0435 01/31/22  0237    138 140   K 4.2 4.6 4.5   CL 99 102 104   CO2 25 24 25   BUN 46* 51* 47*   CREATININE 1.2 1.0 0.9   CALCIUM 8.7 9.0 9.0   GLUCOSE 123* 146* 143*      ABG:  Recent Labs     01/29/22  1138   PHART 7.367   UKW3SMH 46.8*   PO2ART 92.0   GIG0QDP 26.9   [id number] 96.9   BEART 0.9     Procalcitonin  Recent Labs     01/29/22  1159 01/31/22  0237 01/31/22  1016   PROCAL 9.80* 4.36* 3.82*       No results found for: BNP  Lab Results   Component Value Date    TROPONINI <0.01 01/29/2022           Radiology Review:  All pertinent images / reports were reviewed as a part of this visit. Assessment:     Acute hypoxemic respiratory failure  COVID-19 pneumonia  Community-acquired pneumonia  COPD  Previous history of tobacco abuse  Hypercoagulable state    Plan:     I have reviewed laboratories, medical records and images for this visit  -Patient respiratory status remains poor. Currently requiring AirVo at100% / 60 L/min.  -Chest imaging shows dense right lung consolidation. Auscultation findings are also consistent with predominantly right-sided crepitations heard.  -Suspect community-acquired severe pneumonia on the background of COVID-19 infection.  -We will continue him on vancomycin and Zosyn for now. Will de-escalate the antibiotics once culture results are back.  -For COVID-19 infection, patient continues on Decadron 12 mg IV daily. Did receive 1 dosage of IV Actemra.  -On therapeutic Lovenox for hypercoagulable state.  -Renal functions within normal limits. Electrolytes are being replaced as needed. -If patient remains agitated and aggressive, he can be tried on Precedex gtt.  -No family present at the bedside today. Will continue to follow.          Juanita Gonzales MD   Pulmonary Critical Care and Sleep Medicine  [address] [facility],[address]   [place] [address], [place], [address]  1/29/2022, 3:02 PM

## 2022-01-31 NOTE — PROGRESS NOTES
Hospitalist Progress Note      PCP: Rakesh Hart MD    Date of Admission: 1/29/2022    Chief Complaint:  SOB    Hospital Course: Patient is a 69-year-old  male presents from [facility] urgency department. He presented to the emergency department with 2 to 3 days of worsening shortness of breath. Currently he was tested for Covid in the ER there and to test positive.   Chest x-ray reveals bilateral infiltrates.  He is hypoxic, was requiring high flow 15 L/min supplemental oxygen.  Per report obtained from the emergency room, patient did not tolerate BiPAP.  His current saturation is around 90%. At the time of my     Subjective:   1/31/2022  Remains on airvo and goes on BiPAP prn  Has become very impulsive. Pulling at lines and catheter  He now is needing a in room sitter. Medications:  Reviewed    Infusion Medications    sodium chloride 25 mL (01/30/22 1146)    dexmedetomidine Stopped (01/30/22 0931)     Scheduled Medications    piperacillin-tazobactam  3,375 mg IntraVENous Q8H    sodium chloride flush  5-40 mL IntraVENous 2 times per day    enoxaparin  100 mg SubCUTAneous BID    dexamethasone  20 mg IntraVENous Daily    albuterol sulfate HFA  2 puff Inhalation 4x daily     PRN Meds: sodium chloride flush, sodium chloride, ondansetron **OR** ondansetron, polyethylene glycol, acetaminophen **OR** acetaminophen, albuterol      Intake/Output Summary (Last 24 hours) at 1/31/2022 1850  Last data filed at 1/31/2022 1100  Gross per 24 hour   Intake 400 ml   Output 1750 ml   Net -1350 ml       Physical Exam Performed:    /80   Pulse 86   Temp 97.8 °F (36.6 °C) (Temporal)   Resp 22   Ht 6' 2\" (1.88 m)   Wt 229 lb 8 oz (104.1 kg)   SpO2 93%   BMI 29.47 kg/m²     General appearance: He is in mild respiratory distress but appears comfortable on his air Vo as well as the nonrebreather. HEENT Normal cephalic, atraumatic without obvious deformity.   Pupils equal, round, and reactive to light. Extra ocular muscles intact. Conjunctivae/corneas clear. Neck: Supple, No jugular venous distention/bruits. Trachea midline without thyromegaly or adenopathy with full range of motion. Lungs: He has rales at the bases bilaterally but is moving air okay no wheeze or rhonchi are auscultated. Heart: Regular rate and rhythm with Normal S1/S2 without murmurs, rubs or gallops, point of maximum impulse non-displaced  Abdomen: Soft, non-tender or non-distended without rigidity or guarding and positive bowel sounds all four quadrants. Extremities: No clubbing, cyanosis, or edema bilaterally. Full range of motion without deformity and normal gait intact. Skin: Skin color, texture, turgor normal.  No rashes or lesions. Neurologic: Alert and oriented X 3 , neurovascularly intact with sensory/motor intact upper extremities/lower extremities, bilaterally. Cranial nerves: II-XII intact, grossly non-focal.  Mental status: Alert, oriented, but is acting more impulsive and seems to be confused at time. Capillary Refill: Acceptable  < 3 seconds  Peripheral Pulses: +3 Easily felt, not easily obliterated with pressure    Labs:   Recent Labs     01/29/22  1559 01/30/22  0435 01/31/22  0237   WBC 13.9* 12.6* 19.3*   HGB 12.9* 13.1* 13.9   HCT 38.8* 38.9* 41.9    164 203     Recent Labs     01/29/22  1558 01/30/22  0435 01/31/22  0237    138 140   K 4.2 4.6 4.5   CL 99 102 104   CO2 25 24 25   BUN 46* 51* 47*   CREATININE 1.2 1.0 0.9   CALCIUM 8.7 9.0 9.0     Recent Labs     01/30/22  0435   AST 33   ALT 25   BILITOT 0.8   ALKPHOS 69     No results for input(s): INR in the last 72 hours. Recent Labs     01/29/22  1159 01/29/22  1559   TROPONINI <0.01 <0.01       Urinalysis:    No results found for: [place], BACTERIA, RBCUA, BLOODU, SPECGRAV, GLUCOSEU    Radiology:  XR CHEST PORTABLE   Final Result   Slight worsening of parenchymal infiltrates greater towards the right.    Findings likely represent pneumonia. Continued follow-up recommended. XR CHEST PORTABLE   Final Result   Areas of dense airspace opacity involving right mid to lower lung zones and   left lower lung zone concerning for multifocal pneumonia. There may also be   some superimposed small bilateral pleural effusions. Assessment/Plan:    Active Hospital Problems    Diagnosis     Pneumonia due to COVID-19 virus [U07.1, J12.82]      Acute respiratory failure  -Secondary to COVID-19 pneumonia as well as a component of COPD and asthma.  -Patient has nebulizers available he has been started on Decadron  -His CRP is elevated and he can likely receive a dose of Actemra versus starting on baricitinib. -As far as I can tell he is unvaccinated however he has not been able to answer my questions and I cannot find any record of his utilization in the chart. was on Airvo and Non-re-breather he was switched  To BiPAP today. (1/31)    COVID-19 pneumonia  -Treatment as noted above  -Consider adding Actemra     Probable secondary infection.  -Patient's procalcitonin is 9.8 based on this we will cover him with empiric antibiotics  -I will start the patient on Zosyn, got a dose of zyvox, MRSA swab negative so this was stopped     Tobacco abuse  -We will start the patient on a nicotine patch     Elevated D-dimer  -Patient will be on therapeutic Lovenox       DVT Prophylaxis: lovenox  Diet: ADULT DIET; Regular  Code Status: Full Code    PT/OT Eval Status: eval and treat     Dispo - -patient is critically ill with COVID-19 pneumonia he is currently requiring max air Vo as well as nonrebreather at this time to keep his sats in the 90s. He seems confused able to answer questions appropriately, he is more talkative than previously.  Medical decision making remains high approximately 33 minutes of critical care time spent.       Macho Abreu MD

## 2022-01-31 NOTE — PROGRESS NOTES
Patient continue to be unreasonable with staff and arguementative. Pt however has been able to answer most orientation questions fine. Pt has tried to pullout sawyer multiple times. Pt is tolerating max airvo and is sating at 90%. pts wife was educated on his condition and how bad his chest xray was. Along with treatment for covid and other types of pneumonia. The wife was asking if he could get transferred back to [address] [place] [address] since they are two hours away. I told her that it is not common that can happen. Pt has 1x1 sitter and has yet to sleep. Pt has sawyer in place.

## 2022-01-31 NOTE — PROGRESS NOTES
Patient remains confused and restless through out the night did not sleep at all. Constantly pulling off all medical devices desats to the low 80\"s 82/83 when bipap is removed. No amount of redirection or reorientation helps. MD ordered sitter for the day.

## 2022-02-01 LAB
ANION GAP SERPL CALCULATED.3IONS-SCNC: 11 MMOL/L (ref 3–16)
BUN BLDV-MCNC: 33 MG/DL (ref 7–20)
C-REACTIVE PROTEIN: 73 MG/L (ref 0–5.1)
CALCIUM SERPL-MCNC: 8.5 MG/DL (ref 8.3–10.6)
CHLORIDE BLD-SCNC: 102 MMOL/L (ref 99–110)
CO2: 25 MMOL/L (ref 21–32)
CREAT SERPL-MCNC: 0.8 MG/DL (ref 0.8–1.3)
GFR AFRICAN AMERICAN: >60
GFR NON-AFRICAN AMERICAN: >60
GLUCOSE BLD-MCNC: 127 MG/DL (ref 70–99)
HCT VFR BLD CALC: 40 % (ref 40.5–52.5)
HEMOGLOBIN: 13.2 G/DL (ref 13.5–17.5)
MCH RBC QN AUTO: 31.1 PG (ref 26–34)
MCHC RBC AUTO-ENTMCNC: 33 G/DL (ref 31–36)
MCV RBC AUTO: 94.3 FL (ref 80–100)
PDW BLD-RTO: 14.8 % (ref 12.4–15.4)
PLATELET # BLD: 205 K/UL (ref 135–450)
PMV BLD AUTO: 10.5 FL (ref 5–10.5)
POTASSIUM SERPL-SCNC: 4.5 MMOL/L (ref 3.5–5.1)
PROCALCITONIN: 2.21 NG/ML (ref 0–0.15)
RBC # BLD: 4.24 M/UL (ref 4.2–5.9)
SODIUM BLD-SCNC: 138 MMOL/L (ref 136–145)
WBC # BLD: 13.1 K/UL (ref 4–11)

## 2022-02-01 PROCEDURE — 2000000000 HC ICU R&B

## 2022-02-01 PROCEDURE — 94640 AIRWAY INHALATION TREATMENT: CPT

## 2022-02-01 PROCEDURE — 2500000003 HC RX 250 WO HCPCS: Performed by: INTERNAL MEDICINE

## 2022-02-01 PROCEDURE — 87205 SMEAR GRAM STAIN: CPT

## 2022-02-01 PROCEDURE — 6370000000 HC RX 637 (ALT 250 FOR IP): Performed by: INTERNAL MEDICINE

## 2022-02-01 PROCEDURE — 85027 COMPLETE CBC AUTOMATED: CPT

## 2022-02-01 PROCEDURE — 87070 CULTURE OTHR SPECIMN AEROBIC: CPT

## 2022-02-01 PROCEDURE — 86140 C-REACTIVE PROTEIN: CPT

## 2022-02-01 PROCEDURE — 99233 SBSQ HOSP IP/OBS HIGH 50: CPT | Performed by: INTERNAL MEDICINE

## 2022-02-01 PROCEDURE — 80048 BASIC METABOLIC PNL TOTAL CA: CPT

## 2022-02-01 PROCEDURE — 84145 PROCALCITONIN (PCT): CPT

## 2022-02-01 PROCEDURE — 2700000000 HC OXYGEN THERAPY PER DAY

## 2022-02-01 PROCEDURE — 36415 COLL VENOUS BLD VENIPUNCTURE: CPT

## 2022-02-01 PROCEDURE — 2580000003 HC RX 258: Performed by: INTERNAL MEDICINE

## 2022-02-01 PROCEDURE — 6360000002 HC RX W HCPCS: Performed by: INTERNAL MEDICINE

## 2022-02-01 PROCEDURE — 94761 N-INVAS EAR/PLS OXIMETRY MLT: CPT

## 2022-02-01 RX ORDER — LOSARTAN POTASSIUM 100 MG/1
100 TABLET ORAL DAILY
Status: DISCONTINUED | OUTPATIENT
Start: 2022-02-01 | End: 2022-02-06

## 2022-02-01 RX ORDER — ALBUTEROL SULFATE 90 UG/1
2 AEROSOL, METERED RESPIRATORY (INHALATION)
Status: ON HOLD | COMMUNITY
End: 2022-02-06 | Stop reason: HOSPADM

## 2022-02-01 RX ORDER — PANTOPRAZOLE SODIUM 40 MG/1
40 TABLET, DELAYED RELEASE ORAL
Status: DISCONTINUED | OUTPATIENT
Start: 2022-02-02 | End: 2022-02-08 | Stop reason: HOSPADM

## 2022-02-01 RX ORDER — OMEPRAZOLE 20 MG/1
20 CAPSULE, DELAYED RELEASE ORAL DAILY
COMMUNITY

## 2022-02-01 RX ORDER — ALBUTEROL SULFATE 90 UG/1
2 AEROSOL, METERED RESPIRATORY (INHALATION) EVERY 4 HOURS PRN
Status: DISCONTINUED | OUTPATIENT
Start: 2022-02-01 | End: 2022-02-08 | Stop reason: HOSPADM

## 2022-02-01 RX ORDER — LANOLIN ALCOHOL/MO/W.PET/CERES
3 CREAM (GRAM) TOPICAL NIGHTLY PRN
Status: DISCONTINUED | OUTPATIENT
Start: 2022-02-01 | End: 2022-02-08 | Stop reason: HOSPADM

## 2022-02-01 RX ADMIN — PIPERACILLIN AND TAZOBACTAM 3375 MG: 3; .375 INJECTION, POWDER, LYOPHILIZED, FOR SOLUTION INTRAVENOUS at 17:01

## 2022-02-01 RX ADMIN — DEXAMETHASONE SODIUM PHOSPHATE 20 MG: 4 INJECTION, SOLUTION INTRA-ARTICULAR; INTRALESIONAL; INTRAMUSCULAR; INTRAVENOUS; SOFT TISSUE at 09:33

## 2022-02-01 RX ADMIN — Medication 2 PUFF: at 09:01

## 2022-02-01 RX ADMIN — ENOXAPARIN SODIUM 100 MG: 100 INJECTION SUBCUTANEOUS at 19:56

## 2022-02-01 RX ADMIN — Medication 2 PUFF: at 21:20

## 2022-02-01 RX ADMIN — SODIUM CHLORIDE 25 ML: 9 INJECTION, SOLUTION INTRAVENOUS at 00:12

## 2022-02-01 RX ADMIN — PIPERACILLIN AND TAZOBACTAM 3375 MG: 3; .375 INJECTION, POWDER, LYOPHILIZED, FOR SOLUTION INTRAVENOUS at 09:24

## 2022-02-01 RX ADMIN — DEXMEDETOMIDINE HYDROCHLORIDE 0.2 MCG/KG/HR: 4 INJECTION, SOLUTION INTRAVENOUS at 00:13

## 2022-02-01 RX ADMIN — ENOXAPARIN SODIUM 100 MG: 100 INJECTION SUBCUTANEOUS at 09:24

## 2022-02-01 RX ADMIN — Medication 10 ML: at 09:25

## 2022-02-01 RX ADMIN — DEXMEDETOMIDINE HYDROCHLORIDE 0.2 MCG/KG/HR: 4 INJECTION, SOLUTION INTRAVENOUS at 00:19

## 2022-02-01 RX ADMIN — LOSARTAN POTASSIUM 100 MG: 100 TABLET, FILM COATED ORAL at 20:03

## 2022-02-01 RX ADMIN — Medication 10 ML: at 19:57

## 2022-02-01 RX ADMIN — PIPERACILLIN AND TAZOBACTAM 3375 MG: 3; .375 INJECTION, POWDER, LYOPHILIZED, FOR SOLUTION INTRAVENOUS at 02:17

## 2022-02-01 RX ADMIN — DEXMEDETOMIDINE HYDROCHLORIDE 0.4 MCG/KG/HR: 4 INJECTION, SOLUTION INTRAVENOUS at 12:59

## 2022-02-01 RX ADMIN — Medication 2 PUFF: at 13:18

## 2022-02-01 ASSESSMENT — PAIN SCALES - GENERAL
PAINLEVEL_OUTOF10: 0

## 2022-02-01 NOTE — PROGRESS NOTES
Patient has a sitter. Is restrained. Still agitated trying to take things off frequently. Pt had not slept for two whole days. Pt was put on precedex to assist with agitation and pt did not fall asleep until 1500. Pt was delirious. Pt is tolerating airvo at 55L 90%  I called the family and updated them on his status and did a medication rec. Pts family desperately wants him to be transferred to a facility closer to them. They were also very angry  About the no visitor in the room policy. pts family was not very medically literate and needed extra explaining.

## 2022-02-01 NOTE — PROGRESS NOTES
Shift assessment completed, see flowsheets. Medications administered, see MAR. Vital signs stable, SPO2 94% on AirVo 60L 92%. Plan of care discussed with pt and family. Safety precautions in place. Call light and bedside table within reach. Pt denies further needs at this time. Will continue to monitor.   Eden Stone RN

## 2022-02-01 NOTE — PROGRESS NOTES
Patient agitated and restless, trying to remove bipap. Airvo placed back on patient, SPO2 93% on 60L 92%. Patient getting angry and trying to pull sawyer and climb out of bed. Precedex gtt restarted and bilateral wrist restraints applied for patient safety.  at bedside.

## 2022-02-01 NOTE — PROGRESS NOTES
Hospitalist Progress Note      PCP: Milka Madrid MD    Date of Admission: 1/29/2022    Chief Complaint:  SOB    Hospital Course: Patient is a 54-year-old  male presents from [facility] urgency department. He presented to the emergency department with 2 to 3 days of worsening shortness of breath. Currently he was tested for Covid in the ER there and to test positive.   Chest x-ray reveals bilateral infiltrates.  He is hypoxic, was requiring high flow 15 L/min supplemental oxygen.  Per report obtained from the emergency room, patient did not tolerate BiPAP.  His current saturation is around 90%. At the time of my     Subjective:   Currently on heated nasal cannula, 92% FiO2, on Precedex. Was agitated    Medications:  Reviewed    Infusion Medications    sodium chloride 25 mL (02/01/22 0012)    dexmedetomidine 0.4 mcg/kg/hr (02/01/22 1259)     Scheduled Medications    piperacillin-tazobactam  3,375 mg IntraVENous Q8H    sodium chloride flush  5-40 mL IntraVENous 2 times per day    enoxaparin  100 mg SubCUTAneous BID    dexamethasone  20 mg IntraVENous Daily    albuterol sulfate HFA  2 puff Inhalation 4x daily     PRN Meds: melatonin, sodium chloride flush, sodium chloride, ondansetron **OR** ondansetron, polyethylene glycol, acetaminophen **OR** acetaminophen, albuterol      Intake/Output Summary (Last 24 hours) at 2/1/2022 1439  Last data filed at 2/1/2022 0544  Gross per 24 hour   Intake 1440.68 ml   Output 975 ml   Net 465.68 ml       Physical Exam Performed:    BP (!) 159/92   Pulse 75   Temp 97.8 °F (36.6 °C) (Temporal)   Resp 25   Ht 6' 2\" (1.88 m)   Wt 226 lb 9.6 oz (102.8 kg)   SpO2 91%   BMI 29.09 kg/m²     General appearance: Alert  HEENT Normal cephalic, atraumatic without obvious deformity. Pupils equal, round, and reactive to light. Extra ocular muscles intact. Conjunctivae/corneas clear. Neck: Supple, No jugular venous distention/bruits.   Trachea midline without thyromegaly or adenopathy with full range of motion. Lungs: He has rales at the bases bilaterally but is moving air okay no wheeze or rhonchi are auscultated. Heart: Regular rate and rhythm with Normal S1/S2 without murmurs, rubs or gallops, point of maximum impulse non-displaced  Abdomen: Soft, non-tender or non-distended without rigidity or guarding and positive bowel sounds all four quadrants. Extremities: No clubbing, cyanosis, or edema bilaterally. Full range of motion without deformity and normal gait intact. Skin: Skin color, texture, turgor normal.  No rashes or lesions. Neurologic: Alert , neurovascularly intact with sensory/motor intact upper extremities/lower extremities, bilaterally. Cranial nerves: II-XII intact, grossly non-focal.  Mental status: Alert  Capillary Refill: Acceptable  < 3 seconds  Peripheral Pulses: +3 Easily felt, not easily obliterated with pressure    Labs:   Recent Labs     01/30/22  0435 01/31/22 0237 02/01/22  0237   WBC 12.6* 19.3* 13.1*   HGB 13.1* 13.9 13.2*   HCT 38.9* 41.9 40.0*    203 205     Recent Labs     01/30/22  0435 01/31/22  0237 02/01/22  0236    140 138   K 4.6 4.5 4.5    104 102   CO2 24 25 25   BUN 51* 47* 33*   CREATININE 1.0 0.9 0.8   CALCIUM 9.0 9.0 8.5     Recent Labs     01/30/22  0435   AST 33   ALT 25   BILITOT 0.8   ALKPHOS 69     No results for input(s): INR in the last 72 hours. Recent Labs     01/29/22  1559   TROPONINI <0.01       Urinalysis:    No results found for: [place], BACTERIA, RBCUA, BLOODU, SPECGRAV, GLUCOSEU    Radiology:  XR CHEST PORTABLE   Final Result   Slight worsening of parenchymal infiltrates greater towards the right. Findings likely represent pneumonia. Continued follow-up recommended. XR CHEST PORTABLE   Final Result   Areas of dense airspace opacity involving right mid to lower lung zones and   left lower lung zone concerning for multifocal pneumonia.   There may also be some superimposed small bilateral pleural effusions. Assessment/Plan:    Active Hospital Problems    Diagnosis     Pneumonia due to COVID-19 virus [U07.1, J12.82]      Acute respiratory failure  Secondary to COVID-19 pneumonia as well as a component of COPD and asthma.  nebulizers available he has been started on Decadron  Did receive 1 dose of Actemra  Currently on heated nasal cannula, FiO2 92%    COVID-19 pneumonia  Treatment as noted above    Community-acquired pneumonia  Continue vancomycin and Zosyn de-escalate antibiotic based on culture result     Tobacco abuse  Continue nicotine patch     Elevated D-dimer hypercoagulable state  Patient will be on therapeutic Lovenox    Agitated and aggressive  Continue on Precedex, give melatonin as needed at night     DVT Prophylaxis: lovenox therapeutic  Diet: ADULT DIET; Regular  ADULT ORAL NUTRITION SUPPLEMENT; Breakfast, Lunch, Dinner; Standard High Calorie/High Protein Oral Supplement  Code Status: Full Code    PT/OT Eval Status: eval and treat when appropriate    Dispo - -patient is critically ill with COVID-19 pneumonia he is currently requiring max air Vo as well as nonrebreather at this time to keep his sats in the 90s. He seems confused able to answer questions appropriately, he is more talkative than previously.  Medical decision making remains high approximately 33 minutes of critical care time spent.       Shantelle Galarza MD

## 2022-02-01 NOTE — PROGRESS NOTES
[facility] Pulmonary and Critical Care  Progress note      Subjective: Patient lying in bed in no apparent respiratory distress. Oxygen requirements have improved to 60 L/min and FiO2 of 92%. Agitation is less today. Suffering with COVID-19 infection as well as bacterial pneumonia. Past Surgical History:    History reviewed. No pertinent surgical history. Current Medications:    Current Facility-Administered Medications: melatonin tablet 3 mg, 3 mg, Oral, Nightly PRN  piperacillin-tazobactam (ZOSYN) 3,375 mg in dextrose 5 % 50 mL IVPB extended infusion (mini-bag), 3,375 mg, IntraVENous, Q8H  sodium chloride flush 0.9 % injection 5-40 mL, 5-40 mL, IntraVENous, 2 times per day  sodium chloride flush 0.9 % injection 5-40 mL, 5-40 mL, IntraVENous, PRN  0.9 % sodium chloride infusion, 25 mL, IntraVENous, PRN  enoxaparin (LOVENOX) injection 100 mg, 100 mg, SubCUTAneous, BID  ondansetron (ZOFRAN-ODT) disintegrating tablet 4 mg, 4 mg, Oral, Q8H PRN **OR** ondansetron (ZOFRAN) injection 4 mg, 4 mg, IntraVENous, Q6H PRN  polyethylene glycol (GLYCOLAX) packet 17 g, 17 g, Oral, Daily PRN  acetaminophen (TYLENOL) tablet 650 mg, 650 mg, Oral, Q6H PRN **OR** acetaminophen (TYLENOL) suppository 650 mg, 650 mg, Rectal, Q6H PRN  dexamethasone (DECADRON) 20 mg in sodium chloride 0.9 % IVPB, 20 mg, IntraVENous, Daily  albuterol (PROVENTIL) nebulizer solution 2.5 mg, 2.5 mg, Nebulization, Q4H PRN  albuterol sulfate  (90 Base) MCG/ACT inhaler 2 puff, 2 puff, Inhalation, 4x daily  dexmedetomidine (PRECEDEX) 400 mcg in sodium chloride 0.9 % 100 mL infusion, 0.2-1.4 mcg/kg/hr, IntraVENous, Continuous    No Known Allergies    Social History:    TOBACCO:   has no history on file for tobacco use. ETOH:   has no history on file for alcohol use. Patient currently lives independently  Environmental/chemical exposure: None known    Family History:   History reviewed. No pertinent family history.   REVIEW OF SYSTEMS:    No review of systems due to patient factors    Objective:   PHYSICAL EXAM:      VITALS:  BP (!) 159/92   Pulse 75   Temp 97.8 °F (36.6 °C) (Temporal)   Resp 25   Ht 6' 2\" (1.88 m)   Wt 226 lb 9.6 oz (102.8 kg)   SpO2 91%   BMI 29.09 kg/m²      24HR INTAKE/OUTPUT:      Intake/Output Summary (Last 24 hours) at 2/1/2022 1443  Last data filed at 2/1/2022 0544  Gross per 24 hour   Intake 1440.68 ml   Output 975 ml   Net 465.68 ml     CONSTITUTIONAL: He is awake and alert. NECK:  Supple, symmetrical, trachea midline, no adenopathy, thyroid symmetric, not enlarged and no tenderness, skin normal  LUNGS: Right-sided coarse crackles heard. No wheezing heard. CARDIOVASCULAR: S1 and S2, no edema and no JVD  ABDOMEN:  normal bowel sounds, non-distended and no masses palpated, and no tenderness to palpation. No hepatospleenomegaly  LYMPHADENOPATHY:  no axillary or supraclavicular adenopathy. No cervical adnenopathy  PSYCHIATRIC: Alert and oriented  MUSCULOSKELETAL: No obvious misalignment or effusion of the joints. No clubbing, cyanosis of the digits. SKIN:  normal skin color, texture, turgor and no redness, warmth, or swelling. No palpable nodules    DATA:    Old records have been reviewed    CBC:  Recent Labs     01/30/22 0435 01/31/22 0237 02/01/22 0237   WBC 12.6* 19.3* 13.1*   RBC 4.16* 4.46 4.24   HGB 13.1* 13.9 13.2*   HCT 38.9* 41.9 40.0*    203 205   MCV 93.5 93.8 94.3   MCH 31.4 31.2 31.1   MCHC 33.6 33.3 33.0   RDW 15.0 15.1 14.8      BMP:  Recent Labs     01/30/22 0435 01/31/22 0237 02/01/22 0236    140 138   K 4.6 4.5 4.5    104 102   CO2 24 25 25   BUN 51* 47* 33*   CREATININE 1.0 0.9 0.8   CALCIUM 9.0 9.0 8.5   GLUCOSE 146* 143* 127*      ABG:  No results for input(s): PHART, CIF6SSX, PO2ART, UGG7OHA, M3QEHNPS, BEART in the last 72 hours.   Procalcitonin  Recent Labs     01/31/22  0237 01/31/22  1016 02/01/22  0236   PROCAL 4.36* 3.82* 2.21*       No results found for: BNP  Lab Results Component Value Date    TROPONINI <0.01 01/29/2022           Radiology Review:  All pertinent images / reports were reviewed as a part of this visit. Assessment:     Acute hypoxemic respiratory failure  COVID-19 pneumonia  Community-acquired pneumonia  COPD  Previous history of tobacco abuse  Hypercoagulable state    Plan:     I have reviewed laboratories, medical records and images for this visit  -Patient respiratory status is slowly improving. Currently requiring AirVo at 92% / 60 L/min.  -Chest imaging shows dense right lung consolidation.   -Suspect community-acquired severe pneumonia on the background of COVID-19 infection.  -He continues on empiric vancomycin and Zosyn for now. No growth on cultures yet. -For COVID-19 infection, patient continues on Decadron 20 mg IV daily. Did receive 1 dosage of IV Actemra.  -On therapeutic Lovenox for hypercoagulable state.  -Renal functions within normal limits. Electrolytes are being replaced as needed.  -Patient on Precedex gtt. -Inflammatory markers downtrending.  -No family present at the bedside today. Will continue to follow.          Сергей Zavaleta MD   Pulmonary Critical Care and Sleep Medicine  [facility], [place], [address]  1/29/2022, 2:43 PM

## 2022-02-01 NOTE — PLAN OF CARE
Problem: Skin Integrity:  Goal: Will show no infection signs and symptoms  Description: Will show no infection signs and symptoms  Outcome: Ongoing  Goal: Absence of new skin breakdown  Description: Absence of new skin breakdown  Outcome: Ongoing     Problem: Airway Clearance - Ineffective  Goal: Achieve or maintain patent airway  Outcome: Ongoing     Problem: Gas Exchange - Impaired  Goal: Absence of hypoxia  Outcome: Ongoing  Goal: Promote optimal lung function  Outcome: Ongoing     Problem: Breathing Pattern - Ineffective  Goal: Ability to achieve and maintain a regular respiratory rate  Outcome: Ongoing     Problem:  Body Temperature -  Risk of, Imbalanced  Goal: Ability to maintain a body temperature within defined limits  Outcome: Ongoing  Goal: Will regain or maintain usual level of consciousness  Outcome: Ongoing  Goal: Complications related to the disease process, condition or treatment will be avoided or minimized  Outcome: Ongoing     Problem: Isolation Precautions - Risk of Spread of Infection  Goal: Prevent transmission of infection  Outcome: Ongoing     Problem: Nutrition Deficits  Goal: Optimize nutritional status  Outcome: Ongoing     Problem: Risk for Fluid Volume Deficit  Goal: Maintain normal heart rhythm  Outcome: Ongoing  Goal: Maintain absence of muscle cramping  Outcome: Ongoing  Goal: Maintain normal serum potassium, sodium, calcium, phosphorus, and pH  Outcome: Ongoing     Problem: Loneliness or Risk for Loneliness  Goal: Demonstrate positive use of time alone when socialization is not possible  Outcome: Ongoing     Problem: Fatigue  Goal: Verbalize increase energy and improved vitality  Outcome: Ongoing     Problem: Patient Education: Go to Patient Education Activity  Goal: Patient/Family Education  Outcome: Ongoing     Problem: Falls - Risk of:  Goal: Will remain free from falls  Description: Will remain free from falls  Outcome: Ongoing  Goal: Absence of physical injury  Description: Absence of physical injury  Outcome: Ongoing     Problem: Non-Violent Restraints  Goal: Removal from restraints as soon as assessed to be safe  Outcome: Ongoing  Goal: No harm/injury to patient while restraints in use  Outcome: Ongoing  Goal: Patient's dignity will be maintained  Outcome: Ongoing

## 2022-02-02 LAB
ANION GAP SERPL CALCULATED.3IONS-SCNC: 9 MMOL/L (ref 3–16)
BUN BLDV-MCNC: 28 MG/DL (ref 7–20)
CALCIUM SERPL-MCNC: 8.2 MG/DL (ref 8.3–10.6)
CHLORIDE BLD-SCNC: 103 MMOL/L (ref 99–110)
CO2: 27 MMOL/L (ref 21–32)
CREAT SERPL-MCNC: 0.7 MG/DL (ref 0.8–1.3)
GFR AFRICAN AMERICAN: >60
GFR NON-AFRICAN AMERICAN: >60
GLUCOSE BLD-MCNC: 104 MG/DL (ref 70–99)
HCT VFR BLD CALC: 40.1 % (ref 40.5–52.5)
HEMOGLOBIN: 13.7 G/DL (ref 13.5–17.5)
MCH RBC QN AUTO: 31.8 PG (ref 26–34)
MCHC RBC AUTO-ENTMCNC: 34.1 G/DL (ref 31–36)
MCV RBC AUTO: 93.3 FL (ref 80–100)
PDW BLD-RTO: 14.8 % (ref 12.4–15.4)
PLATELET # BLD: 172 K/UL (ref 135–450)
PMV BLD AUTO: 10.5 FL (ref 5–10.5)
POTASSIUM SERPL-SCNC: 4.4 MMOL/L (ref 3.5–5.1)
POTASSIUM SERPL-SCNC: 5.3 MMOL/L (ref 3.5–5.1)
PROCALCITONIN: 1.24 NG/ML (ref 0–0.15)
RBC # BLD: 4.29 M/UL (ref 4.2–5.9)
SODIUM BLD-SCNC: 139 MMOL/L (ref 136–145)
WBC # BLD: 11.9 K/UL (ref 4–11)

## 2022-02-02 PROCEDURE — 99233 SBSQ HOSP IP/OBS HIGH 50: CPT | Performed by: INTERNAL MEDICINE

## 2022-02-02 PROCEDURE — 2000000000 HC ICU R&B

## 2022-02-02 PROCEDURE — 94640 AIRWAY INHALATION TREATMENT: CPT

## 2022-02-02 PROCEDURE — 2700000000 HC OXYGEN THERAPY PER DAY

## 2022-02-02 PROCEDURE — 36415 COLL VENOUS BLD VENIPUNCTURE: CPT

## 2022-02-02 PROCEDURE — 85027 COMPLETE CBC AUTOMATED: CPT

## 2022-02-02 PROCEDURE — 84145 PROCALCITONIN (PCT): CPT

## 2022-02-02 PROCEDURE — 84132 ASSAY OF SERUM POTASSIUM: CPT

## 2022-02-02 PROCEDURE — 6360000002 HC RX W HCPCS: Performed by: INTERNAL MEDICINE

## 2022-02-02 PROCEDURE — 94761 N-INVAS EAR/PLS OXIMETRY MLT: CPT

## 2022-02-02 PROCEDURE — 6370000000 HC RX 637 (ALT 250 FOR IP): Performed by: INTERNAL MEDICINE

## 2022-02-02 PROCEDURE — 80048 BASIC METABOLIC PNL TOTAL CA: CPT

## 2022-02-02 PROCEDURE — 2580000003 HC RX 258: Performed by: INTERNAL MEDICINE

## 2022-02-02 RX ORDER — ALBUTEROL SULFATE 90 UG/1
2 AEROSOL, METERED RESPIRATORY (INHALATION) 2 TIMES DAILY
Status: DISCONTINUED | OUTPATIENT
Start: 2022-02-02 | End: 2022-02-03

## 2022-02-02 RX ADMIN — ENOXAPARIN SODIUM 100 MG: 100 INJECTION SUBCUTANEOUS at 20:55

## 2022-02-02 RX ADMIN — Medication 2 PUFF: at 12:41

## 2022-02-02 RX ADMIN — Medication 2 PUFF: at 15:33

## 2022-02-02 RX ADMIN — PIPERACILLIN AND TAZOBACTAM 3375 MG: 3; .375 INJECTION, POWDER, LYOPHILIZED, FOR SOLUTION INTRAVENOUS at 02:29

## 2022-02-02 RX ADMIN — Medication 10 ML: at 10:22

## 2022-02-02 RX ADMIN — ENOXAPARIN SODIUM 100 MG: 100 INJECTION SUBCUTANEOUS at 10:22

## 2022-02-02 RX ADMIN — DEXAMETHASONE SODIUM PHOSPHATE 20 MG: 4 INJECTION, SOLUTION INTRA-ARTICULAR; INTRALESIONAL; INTRAMUSCULAR; INTRAVENOUS; SOFT TISSUE at 10:04

## 2022-02-02 RX ADMIN — PIPERACILLIN AND TAZOBACTAM 3375 MG: 3; .375 INJECTION, POWDER, LYOPHILIZED, FOR SOLUTION INTRAVENOUS at 10:38

## 2022-02-02 RX ADMIN — PIPERACILLIN AND TAZOBACTAM 3375 MG: 3; .375 INJECTION, POWDER, LYOPHILIZED, FOR SOLUTION INTRAVENOUS at 18:31

## 2022-02-02 RX ADMIN — PANTOPRAZOLE SODIUM 40 MG: 40 TABLET, DELAYED RELEASE ORAL at 10:50

## 2022-02-02 RX ADMIN — Medication 2 PUFF: at 08:15

## 2022-02-02 RX ADMIN — LOSARTAN POTASSIUM 100 MG: 100 TABLET, FILM COATED ORAL at 10:22

## 2022-02-02 RX ADMIN — Medication 2 PUFF: at 18:03

## 2022-02-02 ASSESSMENT — PAIN SCALES - GENERAL
PAINLEVEL_OUTOF10: 0

## 2022-02-02 NOTE — PROGRESS NOTES
Pt reassessed, no acute changes. Remains A/Ox4, cooperative. Remains on Airvo 100%/50L  With sats mid 90's. See flow sheets for complete assessment.

## 2022-02-02 NOTE — PROGRESS NOTES
Shift assessment complete. Vitals obtained. NSR on monitor. Patient 92% on airvo 60L 100%. Patient oriented to questions, but shows shows of disorientation pulling at lines. Patient restless. Precedex increased. Restraints remain on for patient safety. Fluids offered and accepted. Jain in place and draining. Pt states no other needs at this time.  Call light within reach

## 2022-02-02 NOTE — PROGRESS NOTES
Hospitalist Progress Note      PCP: Tanisha Trinh MD    Date of Admission: 1/29/2022    Chief Complaint:  SOB    Hospital Course: Patient is a 65-year-old  male presents from [facility] urgency department. He presented to the emergency department with 2 to 3 days of worsening shortness of breath. Currently he was tested for Covid in the ER there and to test positive.   Chest x-ray reveals bilateral infiltrates.  He is hypoxic, was requiring high flow 15 L/min supplemental oxygen.  Per report obtained from the emergency room, patient did not tolerate BiPAP.  His current saturation is around 90%. At the time of my     Subjective:   Currently on heated nasal cannula, 100% FiO2, no acute event overnight        Medications:  Reviewed    Infusion Medications    sodium chloride 25 mL (02/01/22 0012)    dexmedetomidine Stopped (02/02/22 0325)     Scheduled Medications    losartan  100 mg Oral Daily    pantoprazole  40 mg Oral QAM AC    piperacillin-tazobactam  3,375 mg IntraVENous Q8H    sodium chloride flush  5-40 mL IntraVENous 2 times per day    enoxaparin  100 mg SubCUTAneous BID    dexamethasone  20 mg IntraVENous Daily    albuterol sulfate HFA  2 puff Inhalation 4x daily     PRN Meds: melatonin, albuterol sulfate HFA, sodium chloride flush, sodium chloride, ondansetron **OR** ondansetron, polyethylene glycol, acetaminophen **OR** acetaminophen, albuterol      Intake/Output Summary (Last 24 hours) at 2/2/2022 1518  Last data filed at 2/2/2022 1434  Gross per 24 hour   Intake 1125.73 ml   Output 2200 ml   Net -1074.27 ml       Physical Exam Performed:    BP (!) 148/93   Pulse 67   Temp 97.4 °F (36.3 °C) (Temporal)   Resp 20   Ht 6' 2\" (1.88 m)   Wt 230 lb 8 oz (104.6 kg)   SpO2 93%   BMI 29.59 kg/m²     General appearance: Alert  HEENT Normal cephalic, atraumatic without obvious deformity. Pupils equal, round, and reactive to light. Extra ocular muscles intact. Conjunctivae/corneas clear. Neck: Supple, No jugular venous distention/bruits. Trachea midline without thyromegaly or adenopathy with full range of motion. Lungs: He has rales at the bases bilaterally but is moving air okay no wheeze or rhonchi are auscultated. Heart: Regular rate and rhythm with Normal S1/S2 without murmurs, rubs or gallops, point of maximum impulse non-displaced  Abdomen: Soft, non-tender or non-distended without rigidity or guarding and positive bowel sounds all four quadrants. Extremities: No clubbing, cyanosis, or edema bilaterally. Full range of motion without deformity and normal gait intact. Skin: Skin color, texture, turgor normal.  No rashes or lesions. Neurologic: Alert , neurovascularly intact with sensory/motor intact upper extremities/lower extremities, bilaterally. Cranial nerves: II-XII intact, grossly non-focal.  Mental status: Alert  Capillary Refill: Acceptable  < 3 seconds  Peripheral Pulses: +3 Easily felt, not easily obliterated with pressure    Labs:   Recent Labs     01/31/22 0237 02/01/22 0237 02/02/22 0233   WBC 19.3* 13.1* 11.9*   HGB 13.9 13.2* 13.7   HCT 41.9 40.0* 40.1*    205 172     Recent Labs     01/31/22 0237 01/31/22 0237 02/01/22 0236 02/02/22 0233 02/02/22  1126     --  138 139  --    K 4.5   < > 4.5 5.3* 4.4     --  102 103  --    CO2 25  --  25 27  --    BUN 47*  --  33* 28*  --    CREATININE 0.9  --  0.8 0.7*  --    CALCIUM 9.0  --  8.5 8.2*  --     < > = values in this interval not displayed. No results for input(s): AST, ALT, BILIDIR, BILITOT, ALKPHOS in the last 72 hours. No results for input(s): INR in the last 72 hours. No results for input(s): Tanner Grates in the last 72 hours. Urinalysis:    No results found for: Vilinda Fought, BACTERIA, RBCUA, BLOODU, [address], [address]    Radiology:  XR CHEST PORTABLE   Final Result   Slight worsening of parenchymal infiltrates greater towards the right.    Findings likely represent pneumonia. Continued follow-up recommended. XR CHEST PORTABLE   Final Result   Areas of dense airspace opacity involving right mid to lower lung zones and   left lower lung zone concerning for multifocal pneumonia. There may also be   some superimposed small bilateral pleural effusions. XR CHEST PORTABLE    (Results Pending)           Assessment/Plan:    Active Hospital Problems    Diagnosis     Pneumonia due to COVID-19 virus [U07.1, J12.82]      Acute respiratory failure  Secondary to COVID-19 pneumonia as well as a component of COPD and asthma.  nebulizers available he has been started on Decadron  Did receive 1 dose of Actemra  Currently on heated nasal cannula, FiO2 92%    COVID-19 pneumonia  Treatment as noted above    Community-acquired pneumonia  Continue vancomycin and Zosyn de-escalate antibiotic based on culture result     Tobacco abuse  Continue nicotine patch     Elevated D-dimer hypercoagulable state  Patient will be on therapeutic Lovenox    Agitated and aggressive  Continue on Precedex, give melatonin as needed at night     DVT Prophylaxis: lovenox therapeutic  Diet: ADULT DIET; Regular  ADULT ORAL NUTRITION SUPPLEMENT; Breakfast, Lunch, Dinner; Standard High Calorie/High Protein Oral Supplement  Code Status: Full Code    PT/OT Eval Status: eval and treat when appropriate    Dispo - -patient is critically ill with COVID-19 pneumonia he is currently requiring max air Vo as well as nonrebreather at this time to keep his sats in the 90s. He seems confused able to answer questions appropriately, he is more talkative than previously.  Medical decision making remains high approximately 33 minutes of critical care time spent.       Cl Hess MD

## 2022-02-02 NOTE — PROGRESS NOTES
Pt reassessed, no acute changes. Remains A/Ox4, calm & cooperative following commands. Continues on Airvo 100%/50L  Lungs diminished. Sats low to mid 90's. Denies any c/o  See flow sheets for complete assessment.

## 2022-02-02 NOTE — PROGRESS NOTES
02/02/22 1540   RT Protocol   History Pulmonary Disease 0   Respiratory pattern 2   Breath sounds 4   Cough 0   Bronchodilator Assessment Score 6     Electronically signed by Heather Lawrence RCP on 2/2/2022 at 3:40 PM

## 2022-02-02 NOTE — RT PROTOCOL NOTE
RT Inhaler-Nebulizer Bronchodilator Protocol Note    There is a bronchodilator order in the chart from a provider indicating to follow the RT Bronchodilator Protocol and there is an Initiate RT Inhaler-Nebulizer Bronchodilator Protocol order as well (see protocol at bottom of note). CXR Findings:  No results found. The findings from the last RT Protocol Assessment were as follows:   History Pulmonary Disease: None or smoker <15 pack years  Respiratory Pattern: Dyspnea on exertion or RR 21-25 bpm  Breath Sounds: Intermittent or unilateral wheezes  Cough: Strong, spontaneous, non-productive  Indication for Bronchodilator Therapy: Wheezing associated with pulm disorder  Bronchodilator Assessment Score: 6    Aerosolized bronchodilator medication orders have been revised according to the RT Inhaler-Nebulizer Bronchodilator Protocol below. Respiratory Therapist to perform RT Therapy Protocol Assessment initially then follow the protocol. Repeat RT Therapy Protocol Assessment PRN for score 0-3 or on second treatment, BID, and PRN for scores above 3. No Indications - adjust the frequency to every 6 hours PRN wheezing or bronchospasm, if no treatments needed after 48 hours then discontinue using Per Protocol order mode. If indication present, adjust the RT bronchodilator orders based on the Bronchodilator Assessment Score as indicated below. Use Inhaler orders unless patient has one or more of the following: on home nebulizer, not able to hold breath for 10 seconds, is not alert and oriented, cannot activate and use MDI correctly, or respiratory rate 25 breaths per minute or more, then use the equivalent nebulizer order(s) with same Frequency and PRN reasons based on the score. If a patient is on this medication at home then do not decrease Frequency below that used at home.     0-3 - enter or revise RT bronchodilator order(s) to equivalent RT Bronchodilator order with Frequency of every 4 hours PRN for wheezing or increased work of breathing using Per Protocol order mode. 4-6 - enter or revise RT Bronchodilator order(s) to two equivalent RT bronchodilator orders with one order with BID Frequency and one order with Frequency of every 4 hours PRN wheezing or increased work of breathing using Per Protocol order mode. 7-10 - enter or revise RT Bronchodilator order(s) to two equivalent RT bronchodilator orders with one order with TID Frequency and one order with Frequency of every 4 hours PRN wheezing or increased work of breathing using Per Protocol order mode. 11-13 - enter or revise RT Bronchodilator order(s) to one equivalent RT bronchodilator order with QID Frequency and an Albuterol order with Frequency of every 4 hours PRN wheezing or increased work of breathing using Per Protocol order mode. Greater than 13 - enter or revise RT Bronchodilator order(s) to one equivalent RT bronchodilator order with every 4 hours Frequency and an Albuterol order with Frequency of every 2 hours PRN wheezing or increased work of breathing using Per Protocol order mode. RT to enter RT Home Evaluation for COPD & MDI Assessment order using Per Protocol order mode.     Electronically signed by Chencho Strong RCP on 2/2/2022 at 3:40 PM

## 2022-02-02 NOTE — PLAN OF CARE
Problem: Gas Exchange - Impaired  Goal: Absence of hypoxia  Outcome: Ongoing  COVID +  Airvo in place 100%/50L per min  Continuous SAO2 monitoring  ABG's as ordered    Problem: Isolation Precautions - Risk of Spread of Infection  Goal: Prevent transmission of infection  Outcome: Ongoing   Droplet Plus precautions in place

## 2022-02-02 NOTE — PROGRESS NOTES
[facility] Pulmonary and Critical Care  Progress note      Subjective: Patient lying in bed in no apparent respiratory distress. Oxygen requirements have improved to 60 L/min and FiO2 of 92%. Agitation is less today. Suffering with COVID-19 infection as well as bacterial pneumonia. Past Surgical History:    History reviewed. No pertinent surgical history. Current Medications:    Current Facility-Administered Medications: melatonin tablet 3 mg, 3 mg, Oral, Nightly PRN  albuterol sulfate  (90 Base) MCG/ACT inhaler 2 puff, 2 puff, Inhalation, Q4H PRN  losartan (COZAAR) tablet 100 mg, 100 mg, Oral, Daily  pantoprazole (PROTONIX) tablet 40 mg, 40 mg, Oral, QAM AC  piperacillin-tazobactam (ZOSYN) 3,375 mg in dextrose 5 % 50 mL IVPB extended infusion (mini-bag), 3,375 mg, IntraVENous, Q8H  sodium chloride flush 0.9 % injection 5-40 mL, 5-40 mL, IntraVENous, 2 times per day  sodium chloride flush 0.9 % injection 5-40 mL, 5-40 mL, IntraVENous, PRN  0.9 % sodium chloride infusion, 25 mL, IntraVENous, PRN  enoxaparin (LOVENOX) injection 100 mg, 100 mg, SubCUTAneous, BID  ondansetron (ZOFRAN-ODT) disintegrating tablet 4 mg, 4 mg, Oral, Q8H PRN **OR** ondansetron (ZOFRAN) injection 4 mg, 4 mg, IntraVENous, Q6H PRN  polyethylene glycol (GLYCOLAX) packet 17 g, 17 g, Oral, Daily PRN  acetaminophen (TYLENOL) tablet 650 mg, 650 mg, Oral, Q6H PRN **OR** acetaminophen (TYLENOL) suppository 650 mg, 650 mg, Rectal, Q6H PRN  dexamethasone (DECADRON) 20 mg in sodium chloride 0.9 % IVPB, 20 mg, IntraVENous, Daily  albuterol (PROVENTIL) nebulizer solution 2.5 mg, 2.5 mg, Nebulization, Q4H PRN  albuterol sulfate  (90 Base) MCG/ACT inhaler 2 puff, 2 puff, Inhalation, 4x daily  dexmedetomidine (PRECEDEX) 400 mcg in sodium chloride 0.9 % 100 mL infusion, 0.2-1.4 mcg/kg/hr, IntraVENous, Continuous    No Known Allergies    Social History:    TOBACCO:   has no history on file for tobacco use.   ETOH:   has no history on file for alcohol use. Patient currently lives independently  Environmental/chemical exposure: None known    Family History:   History reviewed. No pertinent family history. REVIEW OF SYSTEMS:    No review of systems due to patient factors    Objective:   PHYSICAL EXAM:      VITALS:  BP (!) 148/93   Pulse 67   Temp 97.4 °F (36.3 °C) (Temporal)   Resp 20   Ht 6' 2\" (1.88 m)   Wt 230 lb 8 oz (104.6 kg)   SpO2 93%   BMI 29.59 kg/m²      24HR INTAKE/OUTPUT:      Intake/Output Summary (Last 24 hours) at 2/2/2022 1250  Last data filed at 2/2/2022 1000  Gross per 24 hour   Intake 493.61 ml   Output 1550 ml   Net -1056.39 ml     CONSTITUTIONAL: He is awake and alert. NECK:  Supple, symmetrical, trachea midline, no adenopathy, thyroid symmetric, not enlarged and no tenderness, skin normal  LUNGS: Right-sided coarse crackles heard. No wheezing heard. CARDIOVASCULAR: S1 and S2, no edema and no JVD  ABDOMEN:  normal bowel sounds, non-distended and no masses palpated, and no tenderness to palpation. No hepatospleenomegaly  LYMPHADENOPATHY:  no axillary or supraclavicular adenopathy. No cervical adnenopathy  PSYCHIATRIC: Alert and oriented  MUSCULOSKELETAL: No obvious misalignment or effusion of the joints. No clubbing, cyanosis of the digits. SKIN:  normal skin color, texture, turgor and no redness, warmth, or swelling.  No palpable nodules    DATA:    Old records have been reviewed    CBC:  Recent Labs     01/31/22 0237 02/01/22 0237 02/02/22 0233   WBC 19.3* 13.1* 11.9*   RBC 4.46 4.24 4.29   HGB 13.9 13.2* 13.7   HCT 41.9 40.0* 40.1*    205 172   MCV 93.8 94.3 93.3   MCH 31.2 31.1 31.8   MCHC 33.3 33.0 34.1   RDW 15.1 14.8 14.8      BMP:  Recent Labs     01/31/22 0237 01/31/22 0237 02/01/22  0236 02/02/22  0233 02/02/22  1126     --  138 139  --    K 4.5   < > 4.5 5.3* 4.4     --  102 103  --    CO2 25  --  25 27  --    BUN 47*  --  33* 28*  --    CREATININE 0.9  --  0.8 0.7*  --    CALCIUM 9.0  -- 8.5 8.2*  --    GLUCOSE 143*  --  127* 104*  --     < > = values in this interval not displayed. ABG:  No results for input(s): PHART, JHI1FCF, PO2ART, EVF5UAS, Q7HOOSXQ, BEART in the last 72 hours. Procalcitonin  Recent Labs     01/31/22  1016 02/01/22  0236 02/02/22  0233   PROCAL 3.82* 2.21* 1.24*       No results found for: BNP  Lab Results   Component Value Date    TROPONINI <0.01 01/29/2022           Radiology Review:  All pertinent images / reports were reviewed as a part of this visit. Assessment:     Acute hypoxemic respiratory failure  COVID-19 pneumonia  Community-acquired pneumonia  COPD  Previous history of tobacco abuse  Hypercoagulable state    Plan:     I have reviewed laboratories, medical records and images for this visit  -Patient respiratory status is slowly improving. Currently requiring AirVo at 92% / 60 L/min.  -We will repeat chest x-ray tomorrow morning.  -Suspect community-acquired severe pneumonia on the background of COVID-19 infection.  -He continues on empiric vancomycin and Zosyn for now. No growth on cultures yet. -For COVID-19 infection, patient continues on Decadron 20 mg IV daily. Did receive 1 dosage of IV Actemra.  -On therapeutic Lovenox for hypercoagulable state.  -Renal functions within normal limits. Electrolytes are being replaced as needed. -Precedex GGT is off. Patient's mentation is slowly improving.  -Inflammatory markers downtrending.  -No family present at the bedside today. Will continue to follow.          Mayito Beverly MD   Pulmonary Critical Care and Sleep Medicine  [address] [facility],[address]   [address] [place], [facility] [address]  1/29/2022, 12:50 PM

## 2022-02-02 NOTE — PROGRESS NOTES
Pt A/Ox3, disoriented to time. Calm & cooperative  Restraints in place. VSS  Monitor NSR  Lungs diminished. Airvo in place 100% with 50L/min flow rate. Sats low to mid 90's. Productive cough of thick tan sputum. Jain in place draining qs urine. Skin intact with palp pulses.

## 2022-02-03 ENCOUNTER — APPOINTMENT (OUTPATIENT)
Dept: GENERAL RADIOLOGY | Age: 72
DRG: 871 | End: 2022-02-03
Attending: INTERNAL MEDICINE
Payer: MEDICARE

## 2022-02-03 LAB
ANION GAP SERPL CALCULATED.3IONS-SCNC: 9 MMOL/L (ref 3–16)
BLOOD CULTURE, ROUTINE: NORMAL
BUN BLDV-MCNC: 22 MG/DL (ref 7–20)
CALCIUM SERPL-MCNC: 8.2 MG/DL (ref 8.3–10.6)
CHLORIDE BLD-SCNC: 98 MMOL/L (ref 99–110)
CO2: 28 MMOL/L (ref 21–32)
CREAT SERPL-MCNC: 0.6 MG/DL (ref 0.8–1.3)
CULTURE, BLOOD 2: NORMAL
CULTURE, RESPIRATORY: NORMAL
GFR AFRICAN AMERICAN: >60
GFR NON-AFRICAN AMERICAN: >60
GLUCOSE BLD-MCNC: 100 MG/DL (ref 70–99)
GRAM STAIN RESULT: NORMAL
HCT VFR BLD CALC: 42.2 % (ref 40.5–52.5)
HEMOGLOBIN: 14.2 G/DL (ref 13.5–17.5)
MCH RBC QN AUTO: 31.4 PG (ref 26–34)
MCHC RBC AUTO-ENTMCNC: 33.8 G/DL (ref 31–36)
MCV RBC AUTO: 92.9 FL (ref 80–100)
PDW BLD-RTO: 14.7 % (ref 12.4–15.4)
PLATELET # BLD: 198 K/UL (ref 135–450)
PMV BLD AUTO: 10.2 FL (ref 5–10.5)
POTASSIUM SERPL-SCNC: 4.8 MMOL/L (ref 3.5–5.1)
PROCALCITONIN: 0.69 NG/ML (ref 0–0.15)
RBC # BLD: 4.54 M/UL (ref 4.2–5.9)
SODIUM BLD-SCNC: 135 MMOL/L (ref 136–145)
WBC # BLD: 14.2 K/UL (ref 4–11)

## 2022-02-03 PROCEDURE — 36415 COLL VENOUS BLD VENIPUNCTURE: CPT

## 2022-02-03 PROCEDURE — 6360000002 HC RX W HCPCS: Performed by: INTERNAL MEDICINE

## 2022-02-03 PROCEDURE — 6370000000 HC RX 637 (ALT 250 FOR IP): Performed by: INTERNAL MEDICINE

## 2022-02-03 PROCEDURE — 94640 AIRWAY INHALATION TREATMENT: CPT

## 2022-02-03 PROCEDURE — 2000000000 HC ICU R&B

## 2022-02-03 PROCEDURE — 85027 COMPLETE CBC AUTOMATED: CPT

## 2022-02-03 PROCEDURE — 71045 X-RAY EXAM CHEST 1 VIEW: CPT

## 2022-02-03 PROCEDURE — 80048 BASIC METABOLIC PNL TOTAL CA: CPT

## 2022-02-03 PROCEDURE — 2700000000 HC OXYGEN THERAPY PER DAY

## 2022-02-03 PROCEDURE — 84145 PROCALCITONIN (PCT): CPT

## 2022-02-03 PROCEDURE — 36430 TRANSFUSION BLD/BLD COMPNT: CPT

## 2022-02-03 PROCEDURE — 2580000003 HC RX 258: Performed by: INTERNAL MEDICINE

## 2022-02-03 PROCEDURE — 94761 N-INVAS EAR/PLS OXIMETRY MLT: CPT

## 2022-02-03 PROCEDURE — 2500000003 HC RX 250 WO HCPCS: Performed by: INTERNAL MEDICINE

## 2022-02-03 PROCEDURE — 99233 SBSQ HOSP IP/OBS HIGH 50: CPT | Performed by: INTERNAL MEDICINE

## 2022-02-03 PROCEDURE — 6360000002 HC RX W HCPCS: Performed by: STUDENT IN AN ORGANIZED HEALTH CARE EDUCATION/TRAINING PROGRAM

## 2022-02-03 RX ORDER — ARFORMOTEROL TARTRATE 15 UG/2ML
15 SOLUTION RESPIRATORY (INHALATION) 2 TIMES DAILY
Status: DISCONTINUED | OUTPATIENT
Start: 2022-02-03 | End: 2022-02-08 | Stop reason: HOSPADM

## 2022-02-03 RX ORDER — LORAZEPAM 2 MG/ML
2 INJECTION INTRAMUSCULAR
Status: DISCONTINUED | OUTPATIENT
Start: 2022-02-03 | End: 2022-02-03

## 2022-02-03 RX ORDER — LORAZEPAM 2 MG/ML
3 INJECTION INTRAMUSCULAR
Status: DISCONTINUED | OUTPATIENT
Start: 2022-02-03 | End: 2022-02-08 | Stop reason: HOSPADM

## 2022-02-03 RX ORDER — BUDESONIDE 0.5 MG/2ML
0.5 INHALANT ORAL 2 TIMES DAILY
Status: DISCONTINUED | OUTPATIENT
Start: 2022-02-03 | End: 2022-02-08 | Stop reason: HOSPADM

## 2022-02-03 RX ORDER — METHYLPREDNISOLONE SODIUM SUCCINATE 40 MG/ML
40 INJECTION, POWDER, LYOPHILIZED, FOR SOLUTION INTRAMUSCULAR; INTRAVENOUS EVERY 6 HOURS
Status: DISCONTINUED | OUTPATIENT
Start: 2022-02-03 | End: 2022-02-05

## 2022-02-03 RX ORDER — LORAZEPAM 2 MG/ML
4 INJECTION INTRAMUSCULAR
Status: DISCONTINUED | OUTPATIENT
Start: 2022-02-03 | End: 2022-02-08 | Stop reason: HOSPADM

## 2022-02-03 RX ORDER — IPRATROPIUM BROMIDE AND ALBUTEROL SULFATE 2.5; .5 MG/3ML; MG/3ML
1 SOLUTION RESPIRATORY (INHALATION)
Status: DISCONTINUED | OUTPATIENT
Start: 2022-02-03 | End: 2022-02-05

## 2022-02-03 RX ORDER — LORAZEPAM 1 MG/1
3 TABLET ORAL
Status: DISCONTINUED | OUTPATIENT
Start: 2022-02-03 | End: 2022-02-08 | Stop reason: HOSPADM

## 2022-02-03 RX ORDER — LORAZEPAM 1 MG/1
4 TABLET ORAL
Status: DISCONTINUED | OUTPATIENT
Start: 2022-02-03 | End: 2022-02-08 | Stop reason: HOSPADM

## 2022-02-03 RX ORDER — ARFORMOTEROL TARTRATE 15 UG/2ML
15 SOLUTION RESPIRATORY (INHALATION) 2 TIMES DAILY
Status: DISCONTINUED | OUTPATIENT
Start: 2022-02-03 | End: 2022-02-03

## 2022-02-03 RX ORDER — LORAZEPAM 2 MG/ML
1 INJECTION INTRAMUSCULAR
Status: DISCONTINUED | OUTPATIENT
Start: 2022-02-03 | End: 2022-02-08 | Stop reason: HOSPADM

## 2022-02-03 RX ORDER — LORAZEPAM 2 MG/ML
2 INJECTION INTRAMUSCULAR
Status: DISCONTINUED | OUTPATIENT
Start: 2022-02-03 | End: 2022-02-08 | Stop reason: HOSPADM

## 2022-02-03 RX ORDER — LORAZEPAM 1 MG/1
2 TABLET ORAL
Status: DISCONTINUED | OUTPATIENT
Start: 2022-02-03 | End: 2022-02-08 | Stop reason: HOSPADM

## 2022-02-03 RX ORDER — LORAZEPAM 1 MG/1
1 TABLET ORAL
Status: DISCONTINUED | OUTPATIENT
Start: 2022-02-03 | End: 2022-02-08 | Stop reason: HOSPADM

## 2022-02-03 RX ADMIN — METHYLPREDNISOLONE SODIUM SUCCINATE 40 MG: 40 INJECTION, POWDER, FOR SOLUTION INTRAMUSCULAR; INTRAVENOUS at 22:38

## 2022-02-03 RX ADMIN — ENOXAPARIN SODIUM 100 MG: 100 INJECTION SUBCUTANEOUS at 20:08

## 2022-02-03 RX ADMIN — PIPERACILLIN AND TAZOBACTAM 3375 MG: 3; .375 INJECTION, POWDER, LYOPHILIZED, FOR SOLUTION INTRAVENOUS at 10:02

## 2022-02-03 RX ADMIN — METHYLPREDNISOLONE SODIUM SUCCINATE 40 MG: 40 INJECTION, POWDER, FOR SOLUTION INTRAMUSCULAR; INTRAVENOUS at 18:59

## 2022-02-03 RX ADMIN — ENOXAPARIN SODIUM 100 MG: 100 INJECTION SUBCUTANEOUS at 09:59

## 2022-02-03 RX ADMIN — BUDESONIDE 500 MCG: 0.5 SUSPENSION RESPIRATORY (INHALATION) at 20:26

## 2022-02-03 RX ADMIN — Medication 2 PUFF: at 08:33

## 2022-02-03 RX ADMIN — Medication 10 ML: at 20:08

## 2022-02-03 RX ADMIN — Medication 10 ML: at 09:56

## 2022-02-03 RX ADMIN — METHYLPREDNISOLONE SODIUM SUCCINATE 40 MG: 40 INJECTION, POWDER, FOR SOLUTION INTRAMUSCULAR; INTRAVENOUS at 10:00

## 2022-02-03 RX ADMIN — IPRATROPIUM BROMIDE AND ALBUTEROL SULFATE 1 AMPULE: .5; 3 SOLUTION RESPIRATORY (INHALATION) at 20:25

## 2022-02-03 RX ADMIN — SODIUM CHLORIDE 25 ML: 9 INJECTION, SOLUTION INTRAVENOUS at 20:06

## 2022-02-03 RX ADMIN — PIPERACILLIN AND TAZOBACTAM 3375 MG: 3; .375 INJECTION, POWDER, LYOPHILIZED, FOR SOLUTION INTRAVENOUS at 04:18

## 2022-02-03 RX ADMIN — LORAZEPAM 2 MG: 2 INJECTION INTRAMUSCULAR; INTRAVENOUS at 17:00

## 2022-02-03 RX ADMIN — LOSARTAN POTASSIUM 100 MG: 100 TABLET, FILM COATED ORAL at 10:00

## 2022-02-03 RX ADMIN — ARFORMOTEROL TARTRATE 15 MCG: 15 SOLUTION RESPIRATORY (INHALATION) at 12:25

## 2022-02-03 RX ADMIN — BUDESONIDE 500 MCG: 0.5 SUSPENSION RESPIRATORY (INHALATION) at 12:25

## 2022-02-03 RX ADMIN — IPRATROPIUM BROMIDE AND ALBUTEROL SULFATE 1 AMPULE: .5; 3 SOLUTION RESPIRATORY (INHALATION) at 16:50

## 2022-02-03 RX ADMIN — LORAZEPAM 2 MG: 2 INJECTION INTRAMUSCULAR; INTRAVENOUS at 22:36

## 2022-02-03 RX ADMIN — IPRATROPIUM BROMIDE AND ALBUTEROL SULFATE 1 AMPULE: .5; 3 SOLUTION RESPIRATORY (INHALATION) at 12:25

## 2022-02-03 RX ADMIN — DEXMEDETOMIDINE HYDROCHLORIDE 0.2 MCG/KG/HR: 4 INJECTION, SOLUTION INTRAVENOUS at 15:54

## 2022-02-03 RX ADMIN — ARFORMOTEROL TARTRATE 15 MCG: 15 SOLUTION RESPIRATORY (INHALATION) at 20:26

## 2022-02-03 RX ADMIN — PIPERACILLIN AND TAZOBACTAM 3375 MG: 3; .375 INJECTION, POWDER, LYOPHILIZED, FOR SOLUTION INTRAVENOUS at 20:06

## 2022-02-03 ASSESSMENT — PAIN SCALES - GENERAL
PAINLEVEL_OUTOF10: 0

## 2022-02-03 NOTE — PROGRESS NOTES
Pt verbally aggressive at times this morning. Pt refused PO medication and linen change. Oriented x4, but has periods of intermittent confusion.

## 2022-02-03 NOTE — PROGRESS NOTES
Patient becoming more restless throughout the night. Patient attempted to get out the bed. Pt educated on present illness. Pt compliant and remains in bed.  Call light within reach

## 2022-02-03 NOTE — PROGRESS NOTES
400 mcg in sodium chloride 0.9 % 100 mL infusion, 0.2-1.4 mcg/kg/hr, IntraVENous, Continuous    No Known Allergies    Social History:    TOBACCO:   has no history on file for tobacco use. ETOH:   has no history on file for alcohol use. Patient currently lives independently  Environmental/chemical exposure: None known    Family History:   History reviewed. No pertinent family history. REVIEW OF SYSTEMS:    No review of systems due to patient factors    Objective:   PHYSICAL EXAM:      VITALS:  BP (!) 150/118   Pulse 74   Temp 97.1 °F (36.2 °C) (Temporal)   Resp 23   Ht 6' 2\" (1.88 m)   Wt 230 lb 4.8 oz (104.5 kg)   SpO2 95%   BMI 29.57 kg/m²      24HR INTAKE/OUTPUT:      Intake/Output Summary (Last 24 hours) at 2/3/2022 1232  Last data filed at 2/3/2022 1100  Gross per 24 hour   Intake 752.12 ml   Output 1800 ml   Net -1047.88 ml     CONSTITUTIONAL: He is awake and alert. NECK:  Supple, symmetrical, trachea midline, no adenopathy, thyroid symmetric, not enlarged and no tenderness, skin normal  LUNGS: Bilateral scattered wheezing heard. Bibasilar crepitations heard. CARDIOVASCULAR: S1 and S2, no edema and no JVD  ABDOMEN:  normal bowel sounds, non-distended and no masses palpated, and no tenderness to palpation. No hepatospleenomegaly  LYMPHADENOPATHY:  no axillary or supraclavicular adenopathy. No cervical adnenopathy  PSYCHIATRIC: Alert and oriented  MUSCULOSKELETAL: No obvious misalignment or effusion of the joints. No clubbing, cyanosis of the digits. SKIN:  normal skin color, texture, turgor and no redness, warmth, or swelling.  No palpable nodules    DATA:    Old records have been reviewed    CBC:  Recent Labs     02/01/22  0237 02/02/22  0233 02/03/22  0439   WBC 13.1* 11.9* 14.2*   RBC 4.24 4.29 4.54   HGB 13.2* 13.7 14.2   HCT 40.0* 40.1* 42.2    172 198   MCV 94.3 93.3 92.9   MCH 31.1 31.8 31.4   MCHC 33.0 34.1 33.8   RDW 14.8 14.8 14.7      BMP:  Recent Labs     02/01/22  0236 02/01/22  0236 02/02/22  0233 02/02/22  1126 02/03/22  0439     --  139  --  135*   K 4.5   < > 5.3* 4.4 4.8     --  103  --  98*   CO2 25  --  27  --  28   BUN 33*  --  28*  --  22*   CREATININE 0.8  --  0.7*  --  0.6*   CALCIUM 8.5  --  8.2*  --  8.2*   GLUCOSE 127*  --  104*  --  100*    < > = values in this interval not displayed. ABG:  No results for input(s): PHART, DJR7TZV, PO2ART, NFV5TTF, W4EJHRJF, BEART in the last 72 hours. Procalcitonin  Recent Labs     02/01/22 0236 02/02/22 0233 02/03/22 0439   PROCAL 2.21* 1.24* 0.69*       No results found for: BNP  Lab Results   Component Value Date    TROPONINI <0.01 01/29/2022           Radiology Review:  All pertinent images / reports were reviewed as a part of this visit. Narrative   EXAMINATION:   ONE XRAY VIEW OF THE CHEST       2/3/2022 5:49 am           FINDINGS:   There has been slight interval worsening of multifocal bilateral airspace   opacification.  Heart size and vascularity are grossly stable.  There is no   pneumothorax or effusion.           Impression   Worsening multifocal bilateral atypical pneumonia.             Assessment:     Acute hypoxemic respiratory failure  COVID-19 pneumonia  Community-acquired pneumonia  COPD  Previous history of tobacco abuse  Hypercoagulable state    Plan:     I have reviewed laboratories, medical records and images for this visit  -Patient respiratory status is slowly improving. Currently requiring AirVo at 90% / 60 L/min.  -Chest x-ray this morning shows slight worsening of bilateral multifocal pneumonia.  -Suspect community-acquired severe pneumonia on the background of COVID-19 infection.  -He continues on empiric vancomycin and Zosyn for now. No growth on cultures yet. -For COVID-19 infection, patient continues on Decadron 20 mg IV daily.   Did receive 1 dosage of IV Actemra.  -On therapeutic Lovenox for hypercoagulable state.  -I will add Brovana, Pulmicort and DuoNeb nebulization to the regimen to help his wheezing.  -Renal functions within normal limits. Electrolytes are being replaced as needed.  -Patient remains intermittently confused. Most likely due to hypoxia and sepsis. -Inflammatory markers downtrending.  -No family present at the bedside today. Will continue to follow.          Kiley Ventura MD   Pulmonary Critical Care and Sleep Medicine  [facility] AT [facility], [place], [address]  1/29/2022, 12:32 PM

## 2022-02-03 NOTE — PLAN OF CARE
Problem: Skin Integrity:  Goal: Will show no infection signs and symptoms  Description: Will show no infection signs and symptoms  Outcome: Ongoing  Goal: Absence of new skin breakdown  Description: Absence of new skin breakdown  Outcome: Ongoing     Problem: Airway Clearance - Ineffective  Goal: Achieve or maintain patent airway  Outcome: Ongoing     Problem: Gas Exchange - Impaired  Goal: Absence of hypoxia  Outcome: Ongoing  Goal: Promote optimal lung function  Outcome: Ongoing     Problem: Breathing Pattern - Ineffective  Goal: Ability to achieve and maintain a regular respiratory rate  Outcome: Ongoing     Problem:  Body Temperature -  Risk of, Imbalanced  Goal: Ability to maintain a body temperature within defined limits  Outcome: Ongoing  Goal: Will regain or maintain usual level of consciousness  Outcome: Ongoing  Goal: Complications related to the disease process, condition or treatment will be avoided or minimized  Outcome: Ongoing     Problem: Isolation Precautions - Risk of Spread of Infection  Goal: Prevent transmission of infection  Outcome: Ongoing     Problem: Nutrition Deficits  Goal: Optimize nutritional status  Outcome: Ongoing     Problem: Risk for Fluid Volume Deficit  Goal: Maintain normal heart rhythm  Outcome: Ongoing  Goal: Maintain absence of muscle cramping  Outcome: Ongoing  Goal: Maintain normal serum potassium, sodium, calcium, phosphorus, and pH  Outcome: Ongoing     Problem: Loneliness or Risk for Loneliness  Goal: Demonstrate positive use of time alone when socialization is not possible  Outcome: Ongoing     Problem: Fatigue  Goal: Verbalize increase energy and improved vitality  Outcome: Ongoing     Problem: Patient Education: Go to Patient Education Activity  Goal: Patient/Family Education  Outcome: Ongoing     Problem: Falls - Risk of:  Goal: Will remain free from falls  Description: Will remain free from falls  Outcome: Ongoing  Goal: Absence of physical injury  Description: Absence of physical injury  Outcome: Ongoing

## 2022-02-03 NOTE — PROGRESS NOTES
obvious deformity. Pupils equal, round, and reactive to light. Extra ocular muscles intact. Conjunctivae/corneas clear. Neck: Supple, No jugular venous distention/bruits. Trachea midline without thyromegaly or adenopathy with full range of motion. Lungs: He has rales at the bases bilaterally but is moving air okay no wheeze or rhonchi are auscultated. Heart: Regular rate and rhythm with Normal S1/S2 without murmurs, rubs or gallops, point of maximum impulse non-displaced  Abdomen: Soft, non-tender or non-distended without rigidity or guarding and positive bowel sounds all four quadrants. Extremities: No clubbing, cyanosis, or edema bilaterally. Full range of motion without deformity and normal gait intact. Skin: Skin color, texture, turgor normal.  No rashes or lesions. Neurologic: Alert , neurovascularly intact with sensory/motor intact upper extremities/lower extremities, bilaterally. Cranial nerves: II-XII intact, grossly non-focal.  Mental status: Alert  Capillary Refill: Acceptable  < 3 seconds  Peripheral Pulses: +3 Easily felt, not easily obliterated with pressure    Labs:   Recent Labs     02/01/22 0237 02/02/22  0233 02/03/22  0439   WBC 13.1* 11.9* 14.2*   HGB 13.2* 13.7 14.2   HCT 40.0* 40.1* 42.2    172 198     Recent Labs     02/01/22 0236 02/01/22 0236 02/02/22  0233 02/02/22  1126 02/03/22  0439     --  139  --  135*   K 4.5   < > 5.3* 4.4 4.8     --  103  --  98*   CO2 25  --  27  --  28   BUN 33*  --  28*  --  22*   CREATININE 0.8  --  0.7*  --  0.6*   CALCIUM 8.5  --  8.2*  --  8.2*    < > = values in this interval not displayed. No results for input(s): AST, ALT, BILIDIR, BILITOT, ALKPHOS in the last 72 hours. No results for input(s): INR in the last 72 hours. No results for input(s): Tanner Grates in the last 72 hours.     Urinalysis:    No results found for: Vilinda Fought, BACTERIA, RBCUA, BLOODU, [address]    Radiology:  XR CHEST PORTABLE Final Result   Worsening multifocal bilateral atypical pneumonia. XR CHEST PORTABLE   Final Result   Slight worsening of parenchymal infiltrates greater towards the right. Findings likely represent pneumonia. Continued follow-up recommended. XR CHEST PORTABLE   Final Result   Areas of dense airspace opacity involving right mid to lower lung zones and   left lower lung zone concerning for multifocal pneumonia. There may also be   some superimposed small bilateral pleural effusions. Assessment/Plan:    Active Hospital Problems    Diagnosis     Pneumonia due to COVID-19 virus [U07.1, J12.82]      Acute respiratory failure  Secondary to COVID-19 pneumonia as well as a component of COPD and asthma.  nebulizers available he has been started on Decadron  Did receive 1 dose of Actemra  Currently on heated nasal cannula, FiO2 92%    COVID-19 pneumonia  Treatment as noted above    Community-acquired pneumonia  Continue vancomycin and Zosyn de-escalate antibiotic based on culture result     Tobacco abuse  Continue nicotine patch     Elevated D-dimer hypercoagulable state  Patient will be on therapeutic Lovenox    Agitated and aggressive  Continue on Precedex, give melatonin as needed at night     DVT Prophylaxis: lovenox therapeutic  Diet: ADULT DIET; Regular  ADULT ORAL NUTRITION SUPPLEMENT; Breakfast, Lunch, Dinner; Standard High Calorie/High Protein Oral Supplement  Code Status: Full Code    PT/OT Eval Status: eval and treat when appropriate    Dispo - -patient is critically ill with COVID-19 pneumonia he is currently requiring max air Vo as well as nonrebreather at this time to keep his sats in the 90s. He seems confused able to answer questions appropriately, he is more talkative than previously.  Medical decision making remains high approximately 33 minutes of critical care time spent.       Sera Edmonds MD

## 2022-02-03 NOTE — PLAN OF CARE
Problem: Falls - Risk of:  Goal: Will remain free from falls  Description: Will remain free from falls  2/2/2022 2249 by Ryne Sandoval RN  Note: No attempts to get out of bed without assistance. Pt reminded to call for assist before ambulating. Nonskid socks on. Bed locked and in lowest position. Side rails up x3. Exit alarm in use. Call light in reach. Remains free from injury. Will cont to monitor safety.

## 2022-02-03 NOTE — PROGRESS NOTES
Patient started on CIWA protocol due to history and present hallucinations, confusion, and agitation. Ativan given as documented in [facility]. Precedex remains on. Patient resting quietly in bed, AirVo in place. Bed alarm engaged. Call light within reach.

## 2022-02-03 NOTE — PROGRESS NOTES
Patient confused, pulled IV in right hand out, attempting to get out of bed. Intermittently pulling off oxygen, desats to 70s. IV replaced in left hand, reoriented to situation and hospital room. Jain leaking around insertion site, balloon re-inflated. Sheet and pad changed, patient placed back in bed, placed in a position of comfort. Patient also reported seeing \"flying rats\" around the hospital. Dr. Luana Forrester notified. Precedex restarted as ordered. Will continue to monitor patient closely.

## 2022-02-03 NOTE — PROGRESS NOTES
Shift assessment completed. A&Ox4. VSS. Patient in SR on the monitor. Denies any pain at this time. Bed alarm engaged. Call light within reach. See flowsheets for further details. Ox4.

## 2022-02-04 PROBLEM — Z72.0 TOBACCO ABUSE: Status: ACTIVE | Noted: 2022-02-04

## 2022-02-04 PROBLEM — I10 HTN (HYPERTENSION): Status: ACTIVE | Noted: 2022-02-04

## 2022-02-04 PROBLEM — F10.939 ALCOHOL WITHDRAWAL (HCC): Status: ACTIVE | Noted: 2022-02-04

## 2022-02-04 PROBLEM — J96.01 ACUTE RESPIRATORY FAILURE WITH HYPOXIA (HCC): Status: ACTIVE | Noted: 2022-02-04

## 2022-02-04 PROBLEM — J44.1 COPD WITH ACUTE EXACERBATION (HCC): Status: ACTIVE | Noted: 2022-02-04

## 2022-02-04 LAB
ANION GAP SERPL CALCULATED.3IONS-SCNC: 8 MMOL/L (ref 3–16)
BUN BLDV-MCNC: 24 MG/DL (ref 7–20)
CALCIUM SERPL-MCNC: 8.6 MG/DL (ref 8.3–10.6)
CHLORIDE BLD-SCNC: 100 MMOL/L (ref 99–110)
CO2: 29 MMOL/L (ref 21–32)
CREAT SERPL-MCNC: 0.6 MG/DL (ref 0.8–1.3)
GFR AFRICAN AMERICAN: >60
GFR NON-AFRICAN AMERICAN: >60
GLUCOSE BLD-MCNC: 125 MG/DL (ref 70–99)
HCT VFR BLD CALC: 43.9 % (ref 40.5–52.5)
HEMOGLOBIN: 14.6 G/DL (ref 13.5–17.5)
MCH RBC QN AUTO: 31.3 PG (ref 26–34)
MCHC RBC AUTO-ENTMCNC: 33.2 G/DL (ref 31–36)
MCV RBC AUTO: 94.3 FL (ref 80–100)
PDW BLD-RTO: 14.7 % (ref 12.4–15.4)
PLATELET # BLD: 182 K/UL (ref 135–450)
PMV BLD AUTO: 10.5 FL (ref 5–10.5)
POTASSIUM SERPL-SCNC: 5.4 MMOL/L (ref 3.5–5.1)
PROCALCITONIN: 0.48 NG/ML (ref 0–0.15)
RBC # BLD: 4.66 M/UL (ref 4.2–5.9)
SODIUM BLD-SCNC: 137 MMOL/L (ref 136–145)
WBC # BLD: 9.9 K/UL (ref 4–11)

## 2022-02-04 PROCEDURE — 6370000000 HC RX 637 (ALT 250 FOR IP): Performed by: INTERNAL MEDICINE

## 2022-02-04 PROCEDURE — 85027 COMPLETE CBC AUTOMATED: CPT

## 2022-02-04 PROCEDURE — 2580000003 HC RX 258: Performed by: INTERNAL MEDICINE

## 2022-02-04 PROCEDURE — 6360000002 HC RX W HCPCS: Performed by: INTERNAL MEDICINE

## 2022-02-04 PROCEDURE — 99233 SBSQ HOSP IP/OBS HIGH 50: CPT | Performed by: INTERNAL MEDICINE

## 2022-02-04 PROCEDURE — 94640 AIRWAY INHALATION TREATMENT: CPT

## 2022-02-04 PROCEDURE — 2700000000 HC OXYGEN THERAPY PER DAY

## 2022-02-04 PROCEDURE — 80048 BASIC METABOLIC PNL TOTAL CA: CPT

## 2022-02-04 PROCEDURE — 2000000000 HC ICU R&B

## 2022-02-04 PROCEDURE — 36415 COLL VENOUS BLD VENIPUNCTURE: CPT

## 2022-02-04 PROCEDURE — 84145 PROCALCITONIN (PCT): CPT

## 2022-02-04 PROCEDURE — 2500000003 HC RX 250 WO HCPCS: Performed by: INTERNAL MEDICINE

## 2022-02-04 PROCEDURE — 94761 N-INVAS EAR/PLS OXIMETRY MLT: CPT

## 2022-02-04 RX ADMIN — ARFORMOTEROL TARTRATE 15 MCG: 15 SOLUTION RESPIRATORY (INHALATION) at 09:01

## 2022-02-04 RX ADMIN — BUDESONIDE 500 MCG: 0.5 SUSPENSION RESPIRATORY (INHALATION) at 21:13

## 2022-02-04 RX ADMIN — IPRATROPIUM BROMIDE AND ALBUTEROL SULFATE 1 AMPULE: .5; 3 SOLUTION RESPIRATORY (INHALATION) at 16:18

## 2022-02-04 RX ADMIN — DEXMEDETOMIDINE HYDROCHLORIDE 0.2 MCG/KG/HR: 4 INJECTION, SOLUTION INTRAVENOUS at 00:06

## 2022-02-04 RX ADMIN — BUDESONIDE 500 MCG: 0.5 SUSPENSION RESPIRATORY (INHALATION) at 09:01

## 2022-02-04 RX ADMIN — PIPERACILLIN AND TAZOBACTAM 3375 MG: 3; .375 INJECTION, POWDER, LYOPHILIZED, FOR SOLUTION INTRAVENOUS at 23:20

## 2022-02-04 RX ADMIN — PIPERACILLIN AND TAZOBACTAM 3375 MG: 3; .375 INJECTION, POWDER, LYOPHILIZED, FOR SOLUTION INTRAVENOUS at 15:30

## 2022-02-04 RX ADMIN — Medication 10 ML: at 20:31

## 2022-02-04 RX ADMIN — METHYLPREDNISOLONE SODIUM SUCCINATE 40 MG: 40 INJECTION, POWDER, FOR SOLUTION INTRAMUSCULAR; INTRAVENOUS at 23:18

## 2022-02-04 RX ADMIN — ENOXAPARIN SODIUM 100 MG: 100 INJECTION SUBCUTANEOUS at 11:12

## 2022-02-04 RX ADMIN — METHYLPREDNISOLONE SODIUM SUCCINATE 40 MG: 40 INJECTION, POWDER, FOR SOLUTION INTRAMUSCULAR; INTRAVENOUS at 05:26

## 2022-02-04 RX ADMIN — ARFORMOTEROL TARTRATE 15 MCG: 15 SOLUTION RESPIRATORY (INHALATION) at 21:13

## 2022-02-04 RX ADMIN — IPRATROPIUM BROMIDE AND ALBUTEROL SULFATE 1 AMPULE: .5; 3 SOLUTION RESPIRATORY (INHALATION) at 09:01

## 2022-02-04 RX ADMIN — ENOXAPARIN SODIUM 100 MG: 100 INJECTION SUBCUTANEOUS at 20:31

## 2022-02-04 RX ADMIN — METHYLPREDNISOLONE SODIUM SUCCINATE 40 MG: 40 INJECTION, POWDER, FOR SOLUTION INTRAMUSCULAR; INTRAVENOUS at 18:56

## 2022-02-04 RX ADMIN — IPRATROPIUM BROMIDE AND ALBUTEROL SULFATE 1 AMPULE: .5; 3 SOLUTION RESPIRATORY (INHALATION) at 21:13

## 2022-02-04 RX ADMIN — METHYLPREDNISOLONE SODIUM SUCCINATE 40 MG: 40 INJECTION, POWDER, FOR SOLUTION INTRAMUSCULAR; INTRAVENOUS at 12:36

## 2022-02-04 RX ADMIN — IPRATROPIUM BROMIDE AND ALBUTEROL SULFATE 1 AMPULE: .5; 3 SOLUTION RESPIRATORY (INHALATION) at 12:49

## 2022-02-04 RX ADMIN — LOSARTAN POTASSIUM 100 MG: 100 TABLET, FILM COATED ORAL at 12:36

## 2022-02-04 RX ADMIN — PANTOPRAZOLE SODIUM 40 MG: 40 TABLET, DELAYED RELEASE ORAL at 11:12

## 2022-02-04 RX ADMIN — PIPERACILLIN AND TAZOBACTAM 3375 MG: 3; .375 INJECTION, POWDER, LYOPHILIZED, FOR SOLUTION INTRAVENOUS at 04:38

## 2022-02-04 ASSESSMENT — PAIN SCALES - GENERAL
PAINLEVEL_OUTOF10: 0

## 2022-02-04 NOTE — PROGRESS NOTES
Hospitalist Progress Note      PCP: Dana Osuna MD    Date of Admission: 1/29/2022    Chief Complaint: Direct admit from [facility] ER with COVID 19 PNA and acute respiratory failure with hypoxia    Hospital Course: 65 yo M with COPD, unvaccinated against COVID, ongoing tobacco abuse, alcohol abuse, GERD, HTN was directly admitted to ICU for COVID 19 PNA with acute respiratory failure with hypoxia. Started on IV Decadron and given Actemra. Remains on AirVo. Followed by CCM. Started on Precedex gtt for possible alcohol withdrawal.  Changed to IV Solumedrol on 2/3/22. Started on IV Vanco and Zosyn for PNA. Subjective: Patient on Precedex gtt overnight. Patient on 50 L AirVo. Feels SOB and coughing. No CP, HA or abdominal pain.       Medications:  Reviewed    Infusion Medications    sodium chloride 100 mL/hr at 02/04/22 2035    dexmedetomidine Stopped (02/04/22 0200)     Scheduled Medications    methylPREDNISolone  40 mg IntraVENous Q6H    budesonide  0.5 mg Nebulization BID    ipratropium-albuterol  1 ampule Inhalation Q4H WA    Arformoterol Tartrate  15 mcg Nebulization BID    losartan  100 mg Oral Daily    pantoprazole  40 mg Oral QAM AC    piperacillin-tazobactam  3,375 mg IntraVENous Q8H    sodium chloride flush  5-40 mL IntraVENous 2 times per day    enoxaparin  100 mg SubCUTAneous BID     PRN Meds: LORazepam **OR** LORazepam **OR** LORazepam **OR** LORazepam **OR** LORazepam **OR** LORazepam **OR** LORazepam **OR** LORazepam, melatonin, albuterol sulfate HFA, sodium chloride flush, sodium chloride, ondansetron **OR** ondansetron, polyethylene glycol, acetaminophen **OR** acetaminophen, albuterol      Intake/Output Summary (Last 24 hours) at 2/4/2022 2231  Last data filed at 2/4/2022 2035  Gross per 24 hour   Intake 4379.01 ml   Output 1125 ml   Net 3254.01 ml       Physical Exam Performed:    /75   Pulse 74   Temp 97.4 °F (36.3 °C) (Temporal)   Resp 14   Ht 6' 2\" (1.88 m)   Wt 230 lb 13.2 oz (104.7 kg)   SpO2 94%   BMI 29.64 kg/m²     General appearance: No apparent distress, appears stated age and cooperative. HEENT: Pupils equal, round, and reactive to light. Conjunctivae/corneas clear. Neck: Supple, with full range of motion. No jugular venous distention. Trachea midline. Respiratory:  BL Rhonchi. BL scattered wheezing. No rales  Cardiovascular: Regular rate and rhythm with normal S1/S2 without murmurs, rubs or gallops. Abdomen: Soft, non-tender, non-distended with normal bowel sounds. Musculoskeletal: No clubbing, cyanosis or edema bilaterally. Full range of motion without deformity. Skin: Skin color, texture, turgor normal.  No rashes or lesions. Neurologic:  Neurovascularly intact without any focal sensory/motor deficits. Cranial nerves: II-XII intact, grossly non-focal.  Psychiatric: Alert and oriented, thought content appropriate, normal insight  Capillary Refill: Brisk,3 seconds, normal   Peripheral Pulses: +2 palpable, equal bilaterally       Labs:   Recent Labs     02/02/22  0233 02/03/22  0439 02/04/22  0508   WBC 11.9* 14.2* 9.9   HGB 13.7 14.2 14.6   HCT 40.1* 42.2 43.9    198 182     Recent Labs     02/02/22  0233 02/02/22  0233 02/02/22  1126 02/03/22  0439 02/04/22  0508     --   --  135* 137   K 5.3*   < > 4.4 4.8 5.4*     --   --  98* 100   CO2 27  --   --  28 29   BUN 28*  --   --  22* 24*   CREATININE 0.7*  --   --  0.6* 0.6*   CALCIUM 8.2*  --   --  8.2* 8.6    < > = values in this interval not displayed. No results for input(s): AST, ALT, BILIDIR, BILITOT, ALKPHOS in the last 72 hours. No results for input(s): INR in the last 72 hours. No results for input(s): Stanislaw Sleeper in the last 72 hours. Urinalysis:    No results found for: Darrelyn Meals, BACTERIA, RBCUA, BLOODU, [address], [address]    Radiology:  XR CHEST PORTABLE   Final Result   Worsening multifocal bilateral atypical pneumonia.          XR CHEST PORTABLE   Final Result   Slight worsening of parenchymal infiltrates greater towards the right. Findings likely represent pneumonia. Continued follow-up recommended. XR CHEST PORTABLE   Final Result   Areas of dense airspace opacity involving right mid to lower lung zones and   left lower lung zone concerning for multifocal pneumonia. There may also be   some superimposed small bilateral pleural effusions. Assessment/Plan:    Active Hospital Problems    Diagnosis     Acute respiratory failure with hypoxia ([facility] [address] [J96.01]     COPD with acute exacerbation (HCC) [J44.1]     Alcohol withdrawal ([facility] [address] [F10.239]     Tobacco abuse [Z72.0]     HTN (hypertension) [I10]     Pneumonia due to COVID-19 virus [U07.1, J12.82]      1. Wean AirVo as tolerated  2. Continue Solumedrol 40 mg IV q6h  3. Precedex gtt PRN  4. CIWA Ativan  5. Continue Zosyn for PNA  6. PT/OT/SLP eval  7. Continue Lovenox 100 mg SQ bid  8. Continue Duoneb      DVT Prophylaxis: Lovenox 100 mg SQ bid  Diet: ADULT DIET; Regular  ADULT ORAL NUTRITION SUPPLEMENT; Breakfast, Lunch, Dinner; Standard High Calorie/High Protein Oral Supplement  Code Status: Full Code    PT/OT Eval Status: Requested    Dispo - Unclear    Discussed with patient, ICU nursing and CM. Remains critically ill from COVID 19 with acute respiratory failure with hypoxia complicated by AECOPD and EtOH WD. Intubate if worsens. Critical care time with patient was 35 minutes excluding separately billable procedures.     Ramesh Dior MD

## 2022-02-04 NOTE — PROGRESS NOTES
Pt woke up and agitated. Only alert to self at this time. Pulled monitoring equipment off oxygen off and oxygen saturation dropped to 71%. RN at bedside and placed back on airvo. O2 saturation increased to 95%. Ativan given per CIWA protocol. Restraints placed per MD for patient safety.

## 2022-02-04 NOTE — PROGRESS NOTES
Shift assessment completed. VSS, SR on the monitor. Patient remains in bilateral wrist restraints, no signs of injuries. Patient declined breakfast at this time. See flowsheets for further details.

## 2022-02-04 NOTE — PROGRESS NOTES
Wrist restraints loosened for patient to eat lunch. Patient removed AirVo one time, easily redirected. Able to answer orientation questions appropriately but forgetful. Patient's daughter, Basil Reyes, called for an update. All questions answered by this RN. See flowsheets for further details.

## 2022-02-04 NOTE — PROGRESS NOTES
Comprehensive Nutrition Assessment    Type and Reason for Visit:  Initial (LOS)    Nutrition Recommendations/Plan:   con't diet as tolerated with Ensure Enlive TID    Nutrition Assessment:  Pt at risk for nutrition compromise AEB variable po intake. Pt is now in restraints & in CIWA precautions. No po intake consumed yesterday. Receives Ensure Enlive TID. Will monitor for improved po intake as mental status improves. Malnutrition Assessment:  Malnutrition Status:  Insufficient data    Context:  Acute Illness       Estimated Daily Nutrient Needs:  Energy (kcal):  2625- 3150 (25-30 kcal/105 kg); Weight Used for Energy Requirements:  Current     Protein (g):  126- 210 g(1.2-2.0g/105kg); Weight Used for Protein Requirements:  Current        Fluid (ml/day):   ; Method Used for Fluid Requirements:  1 ml/kcal      Nutrition Related Findings:  +BS with soft/nondistended abdomen. k+ 5.4      Wounds:  None       Current Nutrition Therapies:    ADULT DIET; Regular  ADULT ORAL NUTRITION SUPPLEMENT; Breakfast, Lunch, Dinner; Standard High Calorie/High Protein Oral Supplement    Anthropometric Measures:  · Height: 6' 2\" (188 cm)  · Current Body Weight: 230 lb (104.3 kg)   · Admission Body Weight:      · Usual Body Weight:       · Ideal Body Weight: 190 lbs; % Ideal Body Weight 121.1 %   · BMI: 29.5  · Adjusted Body Weight:  ; No Adjustment   · Adjusted BMI:      · BMI Categories: Overweight (BMI 25.0-29. 9)       Nutrition Diagnosis:   · Inadequate oral intake related to inadequate protein-energy intake as evidenced by intake 0-25%      Nutrition Interventions:   Food and/or Nutrient Delivery:  Continue Current Diet,Continue Oral Nutrition Supplement  Nutrition Education/Counseling:  Education not indicated   Coordination of Nutrition Care:  Continue to monitor while inpatient    Goals:  po intake at last 50% of meals & supplements       Nutrition Monitoring and Evaluation:   Behavioral-Environmental Outcomes:  None Identified   Food/Nutrient Intake Outcomes:  Food and Nutrient Intake,Supplement Intake  Physical Signs/Symptoms Outcomes:  Biochemical Data,GI Status,Meal Time Behavior     Discharge Planning:     Too soon to determine     Electronically signed by Fco Ortiz RD, LD on 2/4/22 at 11:29 AM EST    Contact: [phone]

## 2022-02-05 LAB
ANION GAP SERPL CALCULATED.3IONS-SCNC: 6 MMOL/L (ref 3–16)
BUN BLDV-MCNC: 28 MG/DL (ref 7–20)
CALCIUM SERPL-MCNC: 8.6 MG/DL (ref 8.3–10.6)
CHLORIDE BLD-SCNC: 98 MMOL/L (ref 99–110)
CO2: 31 MMOL/L (ref 21–32)
CREAT SERPL-MCNC: 0.6 MG/DL (ref 0.8–1.3)
D DIMER: 530 NG/ML DDU (ref 0–229)
GFR AFRICAN AMERICAN: >60
GFR NON-AFRICAN AMERICAN: >60
GLUCOSE BLD-MCNC: 119 MG/DL (ref 70–99)
HCT VFR BLD CALC: 42.9 % (ref 40.5–52.5)
HEMOGLOBIN: 14.3 G/DL (ref 13.5–17.5)
MCH RBC QN AUTO: 31.3 PG (ref 26–34)
MCHC RBC AUTO-ENTMCNC: 33.3 G/DL (ref 31–36)
MCV RBC AUTO: 93.9 FL (ref 80–100)
PDW BLD-RTO: 14.6 % (ref 12.4–15.4)
PLATELET # BLD: 224 K/UL (ref 135–450)
PMV BLD AUTO: 10 FL (ref 5–10.5)
POTASSIUM SERPL-SCNC: 5.3 MMOL/L (ref 3.5–5.1)
PROCALCITONIN: 0.27 NG/ML (ref 0–0.15)
RBC # BLD: 4.57 M/UL (ref 4.2–5.9)
SODIUM BLD-SCNC: 135 MMOL/L (ref 136–145)
WBC # BLD: 15.9 K/UL (ref 4–11)

## 2022-02-05 PROCEDURE — 6370000000 HC RX 637 (ALT 250 FOR IP): Performed by: INTERNAL MEDICINE

## 2022-02-05 PROCEDURE — 85027 COMPLETE CBC AUTOMATED: CPT

## 2022-02-05 PROCEDURE — 6360000002 HC RX W HCPCS: Performed by: INTERNAL MEDICINE

## 2022-02-05 PROCEDURE — 84145 PROCALCITONIN (PCT): CPT

## 2022-02-05 PROCEDURE — 36415 COLL VENOUS BLD VENIPUNCTURE: CPT

## 2022-02-05 PROCEDURE — 2700000000 HC OXYGEN THERAPY PER DAY

## 2022-02-05 PROCEDURE — 92507 TX SP LANG VOICE COMM INDIV: CPT

## 2022-02-05 PROCEDURE — 94640 AIRWAY INHALATION TREATMENT: CPT

## 2022-02-05 PROCEDURE — 2580000003 HC RX 258: Performed by: INTERNAL MEDICINE

## 2022-02-05 PROCEDURE — 2000000000 HC ICU R&B

## 2022-02-05 PROCEDURE — 99233 SBSQ HOSP IP/OBS HIGH 50: CPT | Performed by: INTERNAL MEDICINE

## 2022-02-05 PROCEDURE — 94761 N-INVAS EAR/PLS OXIMETRY MLT: CPT

## 2022-02-05 PROCEDURE — 92526 ORAL FUNCTION THERAPY: CPT

## 2022-02-05 PROCEDURE — 80048 BASIC METABOLIC PNL TOTAL CA: CPT

## 2022-02-05 PROCEDURE — 85379 FIBRIN DEGRADATION QUANT: CPT

## 2022-02-05 RX ORDER — IPRATROPIUM BROMIDE AND ALBUTEROL SULFATE 2.5; .5 MG/3ML; MG/3ML
1 SOLUTION RESPIRATORY (INHALATION) 3 TIMES DAILY
Status: DISCONTINUED | OUTPATIENT
Start: 2022-02-05 | End: 2022-02-08

## 2022-02-05 RX ORDER — METHYLPREDNISOLONE SODIUM SUCCINATE 40 MG/ML
40 INJECTION, POWDER, LYOPHILIZED, FOR SOLUTION INTRAMUSCULAR; INTRAVENOUS EVERY 12 HOURS
Status: DISCONTINUED | OUTPATIENT
Start: 2022-02-05 | End: 2022-02-08 | Stop reason: HOSPADM

## 2022-02-05 RX ADMIN — SODIUM ZIRCONIUM CYCLOSILICATE 5 G: 5 POWDER, FOR SUSPENSION ORAL at 20:19

## 2022-02-05 RX ADMIN — ENOXAPARIN SODIUM 30 MG: 30 INJECTION SUBCUTANEOUS at 20:19

## 2022-02-05 RX ADMIN — IPRATROPIUM BROMIDE AND ALBUTEROL SULFATE 1 AMPULE: .5; 3 SOLUTION RESPIRATORY (INHALATION) at 13:05

## 2022-02-05 RX ADMIN — IPRATROPIUM BROMIDE AND ALBUTEROL SULFATE 1 AMPULE: .5; 3 SOLUTION RESPIRATORY (INHALATION) at 19:49

## 2022-02-05 RX ADMIN — IPRATROPIUM BROMIDE AND ALBUTEROL SULFATE 1 AMPULE: .5; 3 SOLUTION RESPIRATORY (INHALATION) at 09:49

## 2022-02-05 RX ADMIN — Medication 10 ML: at 20:19

## 2022-02-05 RX ADMIN — PIPERACILLIN AND TAZOBACTAM 3375 MG: 3; .375 INJECTION, POWDER, LYOPHILIZED, FOR SOLUTION INTRAVENOUS at 16:29

## 2022-02-05 RX ADMIN — METHYLPREDNISOLONE SODIUM SUCCINATE 40 MG: 40 INJECTION, POWDER, FOR SOLUTION INTRAMUSCULAR; INTRAVENOUS at 04:48

## 2022-02-05 RX ADMIN — PANTOPRAZOLE SODIUM 40 MG: 40 TABLET, DELAYED RELEASE ORAL at 08:30

## 2022-02-05 RX ADMIN — ARFORMOTEROL TARTRATE 15 MCG: 15 SOLUTION RESPIRATORY (INHALATION) at 13:05

## 2022-02-05 RX ADMIN — PIPERACILLIN AND TAZOBACTAM 3375 MG: 3; .375 INJECTION, POWDER, LYOPHILIZED, FOR SOLUTION INTRAVENOUS at 08:34

## 2022-02-05 RX ADMIN — BUDESONIDE 500 MCG: 0.5 SUSPENSION RESPIRATORY (INHALATION) at 13:05

## 2022-02-05 RX ADMIN — METHYLPREDNISOLONE SODIUM SUCCINATE 40 MG: 40 INJECTION, POWDER, FOR SOLUTION INTRAMUSCULAR; INTRAVENOUS at 18:22

## 2022-02-05 RX ADMIN — ARFORMOTEROL TARTRATE 15 MCG: 15 SOLUTION RESPIRATORY (INHALATION) at 19:49

## 2022-02-05 RX ADMIN — Medication 20 ML: at 08:33

## 2022-02-05 RX ADMIN — ENOXAPARIN SODIUM 100 MG: 100 INJECTION SUBCUTANEOUS at 08:30

## 2022-02-05 RX ADMIN — BUDESONIDE 500 MCG: 0.5 SUSPENSION RESPIRATORY (INHALATION) at 19:49

## 2022-02-05 RX ADMIN — LOSARTAN POTASSIUM 100 MG: 100 TABLET, FILM COATED ORAL at 08:30

## 2022-02-05 ASSESSMENT — PAIN SCALES - GENERAL
PAINLEVEL_OUTOF10: 0

## 2022-02-05 NOTE — PROGRESS NOTES
[facility] Pulmonary and Critical Care  Progress note      Subjective: Patient lying in bed in no apparent respiratory distress. Reporting that his breathing has improved. Oxygen requirements continue to improve. Currently on 30 L/min of flow with 50% FiO2. Patient remains afebrile. Intermittently confused. Suffering with COVID-19 infection as well as bacterial pneumonia. Past Surgical History:    History reviewed. No pertinent surgical history.   Current Medications:    Current Facility-Administered Medications: methylPREDNISolone sodium (SOLU-MEDROL) injection 40 mg, 40 mg, IntraVENous, Q12H  ipratropium-albuterol (DUONEB) nebulizer solution 1 ampule, 1 ampule, Inhalation, TID  enoxaparin (LOVENOX) injection 30 mg, 30 mg, SubCUTAneous, BID  budesonide (PULMICORT) nebulizer suspension 500 mcg, 0.5 mg, Nebulization, BID  Arformoterol Tartrate (BROVANA) nebulizer solution 15 mcg, 15 mcg, Nebulization, BID  LORazepam (ATIVAN) tablet 1 mg, 1 mg, Oral, Q1H PRN **OR** LORazepam (ATIVAN) injection 1 mg, 1 mg, IntraVENous, Q1H PRN **OR** LORazepam (ATIVAN) tablet 2 mg, 2 mg, Oral, Q1H PRN **OR** LORazepam (ATIVAN) injection 2 mg, 2 mg, IntraVENous, Q1H PRN **OR** LORazepam (ATIVAN) tablet 3 mg, 3 mg, Oral, Q1H PRN **OR** LORazepam (ATIVAN) injection 3 mg, 3 mg, IntraVENous, Q1H PRN **OR** LORazepam (ATIVAN) tablet 4 mg, 4 mg, Oral, Q1H PRN **OR** LORazepam (ATIVAN) injection 4 mg, 4 mg, IntraVENous, Q1H PRN  melatonin tablet 3 mg, 3 mg, Oral, Nightly PRN  albuterol sulfate  (90 Base) MCG/ACT inhaler 2 puff, 2 puff, Inhalation, Q4H PRN  losartan (COZAAR) tablet 100 mg, 100 mg, Oral, Daily  pantoprazole (PROTONIX) tablet 40 mg, 40 mg, Oral, QAM AC  piperacillin-tazobactam (ZOSYN) 3,375 mg in dextrose 5 % 50 mL IVPB extended infusion (mini-bag), 3,375 mg, IntraVENous, Q8H  sodium chloride flush 0.9 % injection 5-40 mL, 5-40 mL, IntraVENous, 2 times per day  sodium chloride flush 0.9 % injection 5-40 mL, 5-40 mL, IntraVENous, PRN  0.9 % sodium chloride infusion, 25 mL, IntraVENous, PRN  ondansetron (ZOFRAN-ODT) disintegrating tablet 4 mg, 4 mg, Oral, Q8H PRN **OR** ondansetron (ZOFRAN) injection 4 mg, 4 mg, IntraVENous, Q6H PRN  polyethylene glycol (GLYCOLAX) packet 17 g, 17 g, Oral, Daily PRN  acetaminophen (TYLENOL) tablet 650 mg, 650 mg, Oral, Q6H PRN **OR** acetaminophen (TYLENOL) suppository 650 mg, 650 mg, Rectal, Q6H PRN  albuterol (PROVENTIL) nebulizer solution 2.5 mg, 2.5 mg, Nebulization, Q4H PRN    No Known Allergies    Social History:    TOBACCO:   has no history on file for tobacco use. ETOH:   has no history on file for alcohol use. Patient currently lives independently  Environmental/chemical exposure: None known    Family History:   History reviewed. No pertinent family history. REVIEW OF SYSTEMS:    No review of systems due to patient factors    Objective:   PHYSICAL EXAM:      VITALS:  /85   Pulse 63   Temp 98.5 °F (36.9 °C) (Temporal)   Resp 20   Ht 6' 2\" (1.88 m)   Wt 231 lb 7.7 oz (105 kg)   SpO2 92%   BMI 29.72 kg/m²      24HR INTAKE/OUTPUT:      Intake/Output Summary (Last 24 hours) at 2/5/2022 1400  Last data filed at 2/5/2022 0834  Gross per 24 hour   Intake 4231.14 ml   Output 875 ml   Net 3356.14 ml     CONSTITUTIONAL: He is awake and alert. NECK:  Supple, symmetrical, trachea midline, no adenopathy, thyroid symmetric, not enlarged and no tenderness, skin normal  LUNGS: Bilateral scattered wheezing heard. Bibasilar crepitations heard. CARDIOVASCULAR: S1 and S2, no edema and no JVD  ABDOMEN:  normal bowel sounds, non-distended and no masses palpated, and no tenderness to palpation. No hepatospleenomegaly  LYMPHADENOPATHY:  no axillary or supraclavicular adenopathy. No cervical adnenopathy  PSYCHIATRIC: Alert and oriented  MUSCULOSKELETAL: No obvious misalignment or effusion of the joints. No clubbing, cyanosis of the digits.   SKIN:  normal skin color, texture, turgor and no redness, warmth, or swelling. No palpable nodules    DATA:    Old records have been reviewed    CBC:  Recent Labs     02/03/22 0439 02/04/22  0508 02/05/22  0547   WBC 14.2* 9.9 15.9*   RBC 4.54 4.66 4.57   HGB 14.2 14.6 14.3   HCT 42.2 43.9 42.9    182 224   MCV 92.9 94.3 93.9   MCH 31.4 31.3 31.3   MCHC 33.8 33.2 33.3   RDW 14.7 14.7 14.6      BMP:  Recent Labs     02/03/22 0439 02/04/22  0508 02/05/22  0547   * 137 135*   K 4.8 5.4* 5.3*   CL 98* 100 98*   CO2 28 29 31   BUN 22* 24* 28*   CREATININE 0.6* 0.6* 0.6*   CALCIUM 8.2* 8.6 8.6   GLUCOSE 100* 125* 119*      ABG:  No results for input(s): PHART, WVL8RMR, PO2ART, GWX0MFI, F4OJBPLA, BEART in the last 72 hours. Procalcitonin  Recent Labs     02/03/22 0439 02/04/22  0508 02/05/22  0547   PROCAL 0.69* 0.48* 0.27*       No results found for: BNP  Lab Results   Component Value Date    TROPONINI <0.01 01/29/2022           Radiology Review:  All pertinent images / reports were reviewed as a part of this visit. Narrative   EXAMINATION:   ONE XRAY VIEW OF THE CHEST       2/3/2022 5:49 am           FINDINGS:   There has been slight interval worsening of multifocal bilateral airspace   opacification.  Heart size and vascularity are grossly stable.  There is no   pneumothorax or effusion.           Impression   Worsening multifocal bilateral atypical pneumonia.             Assessment:     Acute hypoxemic respiratory failure  COVID-19 pneumonia  Community-acquired pneumonia  COPD  Previous history of tobacco abuse  Hypercoagulable state    Plan:     I have reviewed laboratories, medical records and images for this visit  -Patient respiratory status continues to improve. Currently on AirVo at 30 L/min of flow and 50% FiO2. Transition him to high flow nasal cannula and continue to titrate the flow down to maintain SPO2 above 89%.     -Suspect community-acquired severe pneumonia on the background of COVID-19 infection.  -Has finished a 5-day course of antibiotics. Remains afebrile hemodynamically stable. -For COVID-19 infection, patient continues on Solu-Medrol 40 mg every 6 hours. We will decrease the Solu-Medrol dosage to every 12 hours today. But tomorrow this can be changed to prednisone 40 mg p.o. daily. Thereafter it can be slowly weaned off in next 5 to 7 days.    -Did receive 1 dosage of IV Actemra.  -On therapeutic Lovenox for hypercoagulable state. D-dimer is downtrending. Change him to prophylactic Lovenox dosage.  -With Brovana, Pulmicort and DuoNeb nebulization his wheezing has started to resolve. At the time of discharge she can be given Symbicort 160/4.5 mcg 2 puff twice daily, Spiriva Respimat 2.5 mcg 2 puff daily and albuterol as needed. -Renal functions within normal limits. Electrolytes are being replaced as needed.  -Patient's mentation has returned to baseline.  -Inflammatory markers downtrending.  -Nothing further to add from critical care standpoint. We will sign off.         Сергей Zavaleta MD   Pulmonary Critical Care and Sleep Medicine  [facility]   [address], [place], [address]  1/29/2022, 2:00 PM

## 2022-02-05 NOTE — RT PROTOCOL NOTE
RT Nebulizer Bronchodilator Protocol Note    There is a bronchodilator order in the chart from a provider indicating to follow the RT Bronchodilator Protocol and there is an Initiate RT Bronchodilator Protocol order as well (see protocol at bottom of note). CXR Findings:  No results found. The findings from the last RT Protocol Assessment were as follows:  Smoking: Smoker 15 pack years or more  Respiratory Pattern: Regular pattern and RR 12-20 bpm  Breath Sounds: Slightly diminished and/or crackles  Cough: Weak, productive  Indication for Bronchodilator Therapy: Wheezing associated with pulm disorder  Bronchodilator Assessment Score: 5    Aerosolized bronchodilator medication orders have been revised according to the RT Nebulizer Bronchodilator Protocol below. Respiratory Therapist to perform RT Therapy Protocol Assessment initially then follow the protocol. Repeat RT Therapy Protocol Assessment PRN for score 0-3 or on second treatment, BID, and PRN for scores above 3. No Indications - adjust the frequency to every 6 hours PRN wheezing or bronchospasm, if no treatments needed after 48 hours then discontinue using Per Protocol order mode. If indication present, adjust the RT bronchodilator orders based on the Bronchodilator Assessment Score as indicated below. If a patient is on this medication at home then do not decrease Frequency below that used at home. 0-3 - enter or revise RT bronchodilator order(s) to equivalent RT Bronchodilator order with Frequency of every 4 hours PRN for wheezing or increased work of breathing using Per Protocol order mode. 4-6 - enter or revise RT Bronchodilator order(s) to two equivalent RT bronchodilator orders with one order with BID Frequency and one order with Frequency of every 4 hours PRN wheezing or increased work of breathing using Per Protocol order mode.          7-10 - enter or revise RT Bronchodilator order(s) to two equivalent RT bronchodilator orders with one order with TID Frequency and one order with Frequency of every 4 hours PRN wheezing or increased work of breathing using Per Protocol order mode. 11-13 - enter or revise RT Bronchodilator order(s) to one equivalent RT bronchodilator order with QID Frequency and an Albuterol order with Frequency of every 4 hours PRN wheezing or increased work of breathing using Per Protocol order mode. Greater than 13 - enter or revise RT Bronchodilator order(s) to one equivalent RT bronchodilator order with every 4 hours Frequency and an Albuterol order with Frequency of every 2 hours PRN wheezing or increased work of breathing using Per Protocol order mode. RT to enter RT Home Evaluation for COPD & MDI Assessment order using Per Protocol order mode.     Electronically signed by Ronald Flaherty RCP on 2/5/2022 at 1:12 PM

## 2022-02-05 NOTE — CARE COORDINATION
Discharge Planning:     (CM) spoke with patient's Nurse, Cl, who stated that patient was interested in going to a skilled nursing facility (SNF) upon discharge. Nurse stated that the patient wanted either [facility] or [facility]. CM called and spoke with patient on his hospital room phone due to Covid isolation procedures. Patient confirmed that he would like to go to a SNF upon discharge and confirmed he would like to go to either [facility] or [facility] as they are closer to his home. Patient reported that his Wife and Father have been to [facility] in the past. Patient agreeable to [facility] making referrals to both. CM sent referrals to [facility] and [facility] via [address] [place] [address] Rd. CM called and left a voicemail for [facility] admissions staff ([phone]) requesting a call back to confirm that patient's referral was received. CM provided contact information. CM called and left a voicemail for [facility] admissions staff ([phone]) requesting a call back to confirm that patient's referral was received. CM provided contact information.       Dick CURRY, NICK, [facility] -   [phone]    Electronically signed by ANTOINETTE Childress on 2/5/2022 at 4:24 PM

## 2022-02-05 NOTE — PROGRESS NOTES
.Facility/Department: [address]  Initial Assessment  DYSPHAGIA BEDSIDE SWALLOW EVALUATION     Patient: Luna Jimenez   : 1950   MRN: [id number]      Evaluation Date: 2022   Admitting Diagnosis: Pneumonia due to COVID-19 virus [U07.1, J12.82]  Pain: Pt stated he was in no pain on this date                        H&P: 67 yo M with COPD, unvaccinated against COVID, ongoing tobacco abuse, alcohol abuse, GERD, HTN was directly admitted to ICU for COVID 19 PNA with acute respiratory failure with hypoxia. Started on IV Decadron and given Actemra. Remains on AirVo. Followed by CCM. Started on Precedex gtt for possible alcohol withdrawal.  Changed to IV Solumedrol on 2/3/22. Started on IV Vanco and Zosyn for PNA. Chest xray/Chest CT: Worsening multifocal bilateral atypical pneumonia. History/Prior Level of Function:   Living Status: Lives at home with family  Prior Dysphagia History: None    Dysphagia Impressions/Diagnosis: Oropharyngeal Dysphagia   Pt found independently sitting himself in upright position to prepare for meal time, pt oriented to person, place, time. Pt on 30L O2 with 50% FIO2 via nasal canula and reported he was in no pain on this date. Pt edentulous and states he has been so for over 10 year though it does not effect his eating. Pt with mild deficits in lingual mobility, adequate lingual strength and required liquid to elicit volitional swallow. Pt's volitional cough was strong and he demonstrated adequate labial movement and strength. Pt tolerated thin liquid trail with no s/s of aspiration, appropriate  AP movement, adequate invitation of swallow. Pt tolerated regular and mech soft trial with no s/s of aspiration, intermittent piece meal, intermittent oral residue post swallow cleared independently utilizing second dry swallow or liquid wash. Pt intermittently ate at an inappropriate quick rate placing him at risk for choking.  O2 sats averaged 92% respiratory rate ranged 20-23 during meal.  Pt to remain on regular texture diet with thin liquid. Pt continues to demonstrate risk of aspiration with current respiratory status; ST will continue to follow-up with pt to decrease risk of aspiration. Recommended Diet and Intervention 2/5/2022:  Diet Solids Recommendation:  Regular Texture Liquid Consistency Recommendation: Thin Liquids Recommended form of Meds: As Tolerated      Compensatory Swallowing Strategies:  Upright as possible with all PO intake , Small bites/sips     SHORT TERM DYSPHAGIA GOALS/PLAN OF CARE: Speech therapy for dysphagia tx 1-2 times to ensure diet tolerance. Pt will functionally tolerate recommended diet with no overt clinical s/s of aspiration  Pt will demonstrate understanding of aspiration risk and precautions via education/demonstration with occasional prompting    Dysphagia Therapeutic Intervention:  Diet Tolerance Monitoring     Discharge Recommendations: No further follow-up appears indicated at this time.      Patient Positioning: Upright in bed    Current Diet Level (prior to evaluation): Regular texture diet  Thin liquids      Respiratory Status:   []Room Air   [x]O2 via nasal cannula   []Other:    Dentition:  []Adequate  []Dentures   []Missing Many Teeth  [x]Edentulous  []Other:    Baseline Vocal Quality:  [x]Normal  []Dysphonic   []Aphonic   []Hoarse  []Wet  []Weak  []Other:    Volitional Cough:  [x]Strong  []Weak  []Wet  []Absent  []Congested  []Other:    Volitional Swallow:   []Absent   []Delayed     []Adequate     [x]Required use of drink     Oral Mechanism Exam:  []WFL [x]Mild   [] Moderate  []Severe  []To be assessed  Impaired:   []Left side      []Right side    []Labial ROM/Coordination    []Labial Symmetry   [x]Lingual ROM/Coordination   []Lingual Symmetry  []Gag  []Other:     Oral Phase: []WFL [x]Mild   [] Moderate  []Severe  []To be assessed   [x]Impaired/Prolonged Mastication:   []Spillage Left:   []Spillage Right:  []Pocketing Left:   []Pocketing Right:   []Decreased Anterior to Posterior Transit:   []Suspected Premature Bolus Loss:   []Lingual/Palatal Residue:   []Other:     Pharyngeal Phase: [x]WFL []Mild   [] Moderate  []Severe  []To be assessed   []Delayed Swallow:   []Suspected Pharyngeal Pooling:   []Decreased Laryngeal Elevation:   []Absent Swallow:  []Wet Vocal Quality:   []Throat Clearing-Immediate:   []Throat Clearing-Delayed:   []Cough-Immediate:   []Cough-Delayed:  []Change in Vital Signs:  []Suspected Delayed Pharyngeal Clearing:  []Other:       Eating Assistance:  [x]Independent  []Setup or clean-up assistance   [] Supervision or touching assistance   [] Partial or moderate assistance   [] Substantial or maximal assistance  [] Dependent       EDUCATION:   Provided education regarding role of SLP, results of assessment, recommendations and general speech pathology plan of care. [x] Pt verbalized understanding and agreement   [] Pt requires ongoing learning   [] No evidence of comprehension     If patient discharges prior to next visit, this note will serve as discharge.      Timed Code Minutes: 0  Total Treatment Minutes: 25  Electronically signed by:    Lisette Monge M.A., [address]  Speech-Language Pathologist

## 2022-02-05 NOTE — PROGRESS NOTES
02/05/22 1310   RT Protocol   History Pulmonary Disease 1   Respiratory pattern 0   Breath sounds 2   Cough 2   Bronchodilator Assessment Score 5

## 2022-02-05 NOTE — PROGRESS NOTES
Donzell Pyo Dr. Jamel Dandy requested RN trial patient on high flow and ask family about SNF placement if patient doesn't meet requirements for home O2. Daughter Nohelia Macias called for an update. All questions answered and concerns addressed. Nohelia Macias states she would like to look into [facility] or [facility] since those are closest to their home. Patient agreeable to these.

## 2022-02-05 NOTE — PROGRESS NOTES
Hospitalist Progress Note      PCP: Bill Alamo MD    Date of Admission: 1/29/2022    Chief Complaint: Direct admit from [facility] ER with COVID 19 PNA and acute respiratory failure with hypoxia    Hospital Course: 67 yo M with COPD, unvaccinated against COVID, ongoing tobacco abuse, alcohol abuse, GERD, HTN was directly admitted to ICU for COVID 19 PNA with acute respiratory failure with hypoxia. Started on IV Decadron and given Actemra. Remains on AirVo. Followed by CCM. Started on Precedex gtt for possible alcohol withdrawal.  Changed to IV Solumedrol on 2/3/22. Started on IV Vanco and Zosyn for PNA. Subjective: Patient off Precedex gtt. Patient on 30 L AirVo. Improving SOB and coughing. No CP, HA or abdominal pain.       Medications:  Reviewed    Infusion Medications    sodium chloride 100 mL/hr at 02/04/22 2035     Scheduled Medications    methylPREDNISolone  40 mg IntraVENous Q12H    ipratropium-albuterol  1 ampule Inhalation TID    enoxaparin  30 mg SubCUTAneous BID    budesonide  0.5 mg Nebulization BID    Arformoterol Tartrate  15 mcg Nebulization BID    losartan  100 mg Oral Daily    pantoprazole  40 mg Oral QAM AC    piperacillin-tazobactam  3,375 mg IntraVENous Q8H    sodium chloride flush  5-40 mL IntraVENous 2 times per day     PRN Meds: LORazepam **OR** LORazepam **OR** LORazepam **OR** LORazepam **OR** LORazepam **OR** LORazepam **OR** LORazepam **OR** LORazepam, melatonin, albuterol sulfate HFA, sodium chloride flush, sodium chloride, ondansetron **OR** ondansetron, polyethylene glycol, acetaminophen **OR** acetaminophen, albuterol      Intake/Output Summary (Last 24 hours) at 2/5/2022 1728  Last data filed at 2/5/2022 0834  Gross per 24 hour   Intake 4031.14 ml   Output 350 ml   Net 3681.14 ml       Physical Exam Performed:    /72   Pulse 70   Temp 97.9 °F (36.6 °C) (Temporal)   Resp 17   Ht 6' 2\" (1.88 m)   Wt 231 lb 7.7 oz (105 kg)   SpO2 92% BMI 29.72 kg/m²     General appearance: No apparent distress, appears stated age and cooperative. HEENT: Pupils equal, round, and reactive to light. Conjunctivae/corneas clear. Neck: Supple, with full range of motion. No jugular venous distention. Trachea midline. Respiratory:  BL Rhonchi. BL scattered wheezing. No rales  Cardiovascular: Regular rate and rhythm with normal S1/S2 without murmurs, rubs or gallops. Abdomen: Soft, non-tender, non-distended with normal bowel sounds. Musculoskeletal: No clubbing, cyanosis or edema bilaterally. Full range of motion without deformity. Skin: Skin color, texture, turgor normal.  No rashes or lesions. Neurologic:  Neurovascularly intact without any focal sensory/motor deficits. Cranial nerves: II-XII intact, grossly non-focal.  Psychiatric: Alert and oriented, thought content appropriate, normal insight  Capillary Refill: Brisk,3 seconds, normal   Peripheral Pulses: +2 palpable, equal bilaterally       Labs:   Recent Labs     02/03/22  0439 02/04/22  0508 02/05/22  0547   WBC 14.2* 9.9 15.9*   HGB 14.2 14.6 14.3   HCT 42.2 43.9 42.9    182 224     Recent Labs     02/03/22  0439 02/04/22  0508 02/05/22  0547   * 137 135*   K 4.8 5.4* 5.3*   CL 98* 100 98*   CO2 28 29 31   BUN 22* 24* 28*   CREATININE 0.6* 0.6* 0.6*   CALCIUM 8.2* 8.6 8.6     No results for input(s): AST, ALT, BILIDIR, BILITOT, ALKPHOS in the last 72 hours. No results for input(s): INR in the last 72 hours. No results for input(s): Evonnie Montane in the last 72 hours. Urinalysis:    No results found for: Floyd Fernando, BACTERIA, RBCUA, BLOODU, [address], [address]    Radiology:  XR CHEST PORTABLE   Final Result   Worsening multifocal bilateral atypical pneumonia. XR CHEST PORTABLE   Final Result   Slight worsening of parenchymal infiltrates greater towards the right. Findings likely represent pneumonia. Continued follow-up recommended.          XR CHEST PORTABLE   Final Result   Areas of dense airspace opacity involving right mid to lower lung zones and   left lower lung zone concerning for multifocal pneumonia. There may also be   some superimposed small bilateral pleural effusions. Assessment/Plan:    Active Hospital Problems    Diagnosis     Acute respiratory failure with hypoxia ([facility] [address] [J96.01]     COPD with acute exacerbation (HCC) [J44.1]     Alcohol withdrawal ([facility] [address] [F10.239]     Tobacco abuse [Z72.0]     HTN (hypertension) [I10]     Pneumonia due to COVID-19 virus [U07.1, J12.82]      1. Wean AirVo as tolerated  2. Wean Solumedrol 40 mg IV q12h  3. Brittany Morgans for hyperkalemia  4. CIWA Ativan  5. Continue Zosyn for PNA  6. PT/OT eval  7. Continue Lovenox 100 mg SQ bid  8. Continue Duoneb  9. Passed SLP eval    DVT Prophylaxis: Lovenox 100 mg SQ bid  Diet: ADULT DIET; Regular  ADULT ORAL NUTRITION SUPPLEMENT; Breakfast, Lunch, Dinner; Standard High Calorie/High Protein Oral Supplement  Code Status: Full Code    PT/OT Eval Status: Requested    Dispo - Unclear    Discussed with patient and ICU nursing. EtOH WD and acute respiratory failure seem to be improving.     Manuel Begum MD

## 2022-02-05 NOTE — PROGRESS NOTES
Notified OLGA Yung, of discharge request to discharge to [facility] or [facility], per patient and his family.

## 2022-02-05 NOTE — PROGRESS NOTES
Patient trialed on 15L HFNC while resting in bed. SpO2 remains in the high 90s. Yony Oakes, patient's daughter updated on patient's status. VSS at this time, remains alert and oriented. Call light within reach. Bed alarm engaged.

## 2022-02-06 LAB
ANION GAP SERPL CALCULATED.3IONS-SCNC: 6 MMOL/L (ref 3–16)
BUN BLDV-MCNC: 27 MG/DL (ref 7–20)
CALCIUM SERPL-MCNC: 8.5 MG/DL (ref 8.3–10.6)
CHLORIDE BLD-SCNC: 99 MMOL/L (ref 99–110)
CO2: 28 MMOL/L (ref 21–32)
CREAT SERPL-MCNC: 0.7 MG/DL (ref 0.8–1.3)
D DIMER: 567 NG/ML DDU (ref 0–229)
GFR AFRICAN AMERICAN: >60
GFR NON-AFRICAN AMERICAN: >60
GLUCOSE BLD-MCNC: 120 MG/DL (ref 70–99)
HCT VFR BLD CALC: 41.1 % (ref 40.5–52.5)
HEMOGLOBIN: 13.8 G/DL (ref 13.5–17.5)
MCH RBC QN AUTO: 31.7 PG (ref 26–34)
MCHC RBC AUTO-ENTMCNC: 33.5 G/DL (ref 31–36)
MCV RBC AUTO: 94.8 FL (ref 80–100)
PDW BLD-RTO: 14.9 % (ref 12.4–15.4)
PLATELET # BLD: 184 K/UL (ref 135–450)
PMV BLD AUTO: 10.3 FL (ref 5–10.5)
POTASSIUM SERPL-SCNC: 5.2 MMOL/L (ref 3.5–5.1)
PROCALCITONIN: 0.23 NG/ML (ref 0–0.15)
RBC # BLD: 4.33 M/UL (ref 4.2–5.9)
SODIUM BLD-SCNC: 133 MMOL/L (ref 136–145)
WBC # BLD: 10.2 K/UL (ref 4–11)

## 2022-02-06 PROCEDURE — 97165 OT EVAL LOW COMPLEX 30 MIN: CPT

## 2022-02-06 PROCEDURE — 97161 PT EVAL LOW COMPLEX 20 MIN: CPT

## 2022-02-06 PROCEDURE — 6360000002 HC RX W HCPCS: Performed by: INTERNAL MEDICINE

## 2022-02-06 PROCEDURE — 36415 COLL VENOUS BLD VENIPUNCTURE: CPT

## 2022-02-06 PROCEDURE — 94761 N-INVAS EAR/PLS OXIMETRY MLT: CPT

## 2022-02-06 PROCEDURE — 6370000000 HC RX 637 (ALT 250 FOR IP): Performed by: INTERNAL MEDICINE

## 2022-02-06 PROCEDURE — 94680 O2 UPTK RST&XERS DIR SIMPLE: CPT

## 2022-02-06 PROCEDURE — 84145 PROCALCITONIN (PCT): CPT

## 2022-02-06 PROCEDURE — 97530 THERAPEUTIC ACTIVITIES: CPT

## 2022-02-06 PROCEDURE — 94640 AIRWAY INHALATION TREATMENT: CPT

## 2022-02-06 PROCEDURE — 97535 SELF CARE MNGMENT TRAINING: CPT

## 2022-02-06 PROCEDURE — 2580000003 HC RX 258: Performed by: INTERNAL MEDICINE

## 2022-02-06 PROCEDURE — 85027 COMPLETE CBC AUTOMATED: CPT

## 2022-02-06 PROCEDURE — 85379 FIBRIN DEGRADATION QUANT: CPT

## 2022-02-06 PROCEDURE — 80048 BASIC METABOLIC PNL TOTAL CA: CPT

## 2022-02-06 PROCEDURE — 2700000000 HC OXYGEN THERAPY PER DAY

## 2022-02-06 PROCEDURE — 1200000000 HC SEMI PRIVATE

## 2022-02-06 RX ORDER — ALBUTEROL SULFATE 90 UG/1
2 AEROSOL, METERED RESPIRATORY (INHALATION) EVERY 4 HOURS PRN
Qty: 18 G | Refills: 0 | Status: SHIPPED | OUTPATIENT
Start: 2022-02-06

## 2022-02-06 RX ORDER — FLUTICASONE PROPIONATE 220 UG/1
2 AEROSOL, METERED RESPIRATORY (INHALATION) 2 TIMES DAILY
Qty: 1 EACH | Refills: 0 | Status: SHIPPED | OUTPATIENT
Start: 2022-02-06 | End: 2022-02-08 | Stop reason: HOSPADM

## 2022-02-06 RX ORDER — CARVEDILOL 6.25 MG/1
6.25 TABLET ORAL 2 TIMES DAILY WITH MEALS
Status: DISCONTINUED | OUTPATIENT
Start: 2022-02-06 | End: 2022-02-08 | Stop reason: HOSPADM

## 2022-02-06 RX ORDER — PREDNISONE 10 MG/1
TABLET ORAL
Qty: 10 TABLET | Refills: 0 | Status: SHIPPED | OUTPATIENT
Start: 2022-02-06 | End: 2022-02-08 | Stop reason: HOSPADM

## 2022-02-06 RX ADMIN — Medication 1 SPRAY: at 12:32

## 2022-02-06 RX ADMIN — SODIUM ZIRCONIUM CYCLOSILICATE 5 G: 5 POWDER, FOR SUSPENSION ORAL at 20:40

## 2022-02-06 RX ADMIN — METHYLPREDNISOLONE SODIUM SUCCINATE 40 MG: 40 INJECTION, POWDER, FOR SOLUTION INTRAMUSCULAR; INTRAVENOUS at 06:15

## 2022-02-06 RX ADMIN — BUDESONIDE 500 MCG: 0.5 SUSPENSION RESPIRATORY (INHALATION) at 09:26

## 2022-02-06 RX ADMIN — SODIUM ZIRCONIUM CYCLOSILICATE 5 G: 5 POWDER, FOR SUSPENSION ORAL at 08:16

## 2022-02-06 RX ADMIN — PIPERACILLIN AND TAZOBACTAM 3375 MG: 3; .375 INJECTION, POWDER, LYOPHILIZED, FOR SOLUTION INTRAVENOUS at 00:01

## 2022-02-06 RX ADMIN — ENOXAPARIN SODIUM 30 MG: 30 INJECTION SUBCUTANEOUS at 20:40

## 2022-02-06 RX ADMIN — LOSARTAN POTASSIUM 100 MG: 100 TABLET, FILM COATED ORAL at 08:16

## 2022-02-06 RX ADMIN — PANTOPRAZOLE SODIUM 40 MG: 40 TABLET, DELAYED RELEASE ORAL at 06:15

## 2022-02-06 RX ADMIN — ENOXAPARIN SODIUM 30 MG: 30 INJECTION SUBCUTANEOUS at 08:16

## 2022-02-06 RX ADMIN — IPRATROPIUM BROMIDE AND ALBUTEROL SULFATE 1 AMPULE: .5; 3 SOLUTION RESPIRATORY (INHALATION) at 09:27

## 2022-02-06 RX ADMIN — CARVEDILOL 6.25 MG: 6.25 TABLET, FILM COATED ORAL at 17:37

## 2022-02-06 RX ADMIN — METHYLPREDNISOLONE SODIUM SUCCINATE 40 MG: 40 INJECTION, POWDER, FOR SOLUTION INTRAMUSCULAR; INTRAVENOUS at 17:37

## 2022-02-06 RX ADMIN — ARFORMOTEROL TARTRATE 15 MCG: 15 SOLUTION RESPIRATORY (INHALATION) at 09:27

## 2022-02-06 RX ADMIN — SODIUM ZIRCONIUM CYCLOSILICATE 5 G: 5 POWDER, FOR SUSPENSION ORAL at 14:17

## 2022-02-06 RX ADMIN — Medication 10 ML: at 08:17

## 2022-02-06 RX ADMIN — Medication 10 ML: at 20:40

## 2022-02-06 RX ADMIN — IPRATROPIUM BROMIDE AND ALBUTEROL SULFATE 1 AMPULE: .5; 3 SOLUTION RESPIRATORY (INHALATION) at 15:09

## 2022-02-06 ASSESSMENT — PAIN SCALES - GENERAL
PAINLEVEL_OUTOF10: 0

## 2022-02-06 NOTE — PROGRESS NOTES
Hospitalist Progress Note      PCP: Tasha Ruelas DO    Date of Admission: 1/29/2022    Chief Complaint: Direct admit from [facility] with COVID 19 PNA and acute respiratory failure with hypoxia    Hospital Course: 67 yo M with COPD, unvaccinated against COVID, ongoing tobacco abuse, alcohol abuse, GERD, HTN was directly admitted to ICU for COVID 19 PNA with acute respiratory failure with hypoxia. Started on IV Decadron and given Actemra. Remains on AirVo. Followed by CCM. Started on Precedex gtt for possible alcohol withdrawal.  Changed to IV Solumedrol on 2/3/22. Started on IV Vanco and Zosyn for PNA. [facility]. Stopped Losartan for hyperkalemia on 2/6 and added Coreg 6.25 mg PO bid. Subjective: Patient now on 4.5 L O2 via NC. Required 8 L O2 via NC with activity. Improving SOB and coughing. No CP, HA or abdominal pain. Daughter is outside room.       Medications:  Reviewed    Infusion Medications    sodium chloride 100 mL/hr at 02/04/22 2035     Scheduled Medications    methylPREDNISolone  40 mg IntraVENous Q12H    ipratropium-albuterol  1 ampule Inhalation TID    enoxaparin  30 mg SubCUTAneous BID    sodium zirconium cyclosilicate  5 g Oral TID    budesonide  0.5 mg Nebulization BID    Arformoterol Tartrate  15 mcg Nebulization BID    losartan  100 mg Oral Daily    pantoprazole  40 mg Oral QAM AC    sodium chloride flush  5-40 mL IntraVENous 2 times per day     PRN Meds: sodium chloride, LORazepam **OR** LORazepam **OR** LORazepam **OR** LORazepam **OR** LORazepam **OR** LORazepam **OR** LORazepam **OR** LORazepam, melatonin, albuterol sulfate HFA, sodium chloride flush, sodium chloride, ondansetron **OR** ondansetron, polyethylene glycol, acetaminophen **OR** acetaminophen, albuterol      Intake/Output Summary (Last 24 hours) at 2/6/2022 1554  Last data filed at 2/6/2022 1240  Gross per 24 hour   Intake 480 ml   Output 1280 ml   Net -800 ml Physical Exam Performed:    BP 96/61   Pulse 72   Temp 98.1 °F (36.7 °C) (Temporal)   Resp 18   Ht 6' 2\" (1.88 m)   Wt 232 lb 5.8 oz (105.4 kg)   SpO2 (!) 89%   BMI 29.83 kg/m²     General appearance: No apparent distress, appears stated age and cooperative. HEENT: Pupils equal, round, and reactive to light. Conjunctivae/corneas clear. Neck: Supple, with full range of motion. No jugular venous distention. Trachea midline. Respiratory:  BL Rhonchi. No wheezing. No rales  Cardiovascular: Regular rate and rhythm with normal S1/S2 without murmurs, rubs or gallops. Abdomen: Soft, non-tender, non-distended with normal bowel sounds. Musculoskeletal: No clubbing, cyanosis or edema bilaterally. Full range of motion without deformity. Skin: Skin color, texture, turgor normal.  No rashes or lesions. Neurologic:  Neurovascularly intact without any focal sensory/motor deficits. Cranial nerves: II-XII intact, grossly non-focal.  Psychiatric: Alert and oriented, thought content appropriate, normal insight  Capillary Refill: Brisk,3 seconds, normal   Peripheral Pulses: +2 palpable, equal bilaterally       Labs:   Recent Labs     02/04/22  0508 02/05/22  0547 02/06/22  0303   WBC 9.9 15.9* 10.2   HGB 14.6 14.3 13.8   HCT 43.9 42.9 41.1    224 184     Recent Labs     02/04/22  0508 02/05/22  0547 02/06/22  0303    135* 133*   K 5.4* 5.3* 5.2*    98* 99   CO2 29 31 28   BUN 24* 28* 27*   CREATININE 0.6* 0.6* 0.7*   CALCIUM 8.6 8.6 8.5     No results for input(s): AST, ALT, BILIDIR, BILITOT, ALKPHOS in the last 72 hours. No results for input(s): INR in the last 72 hours. No results for input(s): [place] in the last 72 hours. Urinalysis:    No results found for: Cathalene Bookman, BACTERIA, RBCUA, BLOODU, [address], [address]    Radiology:  XR CHEST PORTABLE   Final Result   Worsening multifocal bilateral atypical pneumonia.          XR CHEST PORTABLE   Final Result   Slight worsening of parenchymal infiltrates greater towards the right. Findings likely represent pneumonia. Continued follow-up recommended. XR CHEST PORTABLE   Final Result   Areas of dense airspace opacity involving right mid to lower lung zones and   left lower lung zone concerning for multifocal pneumonia. There may also be   some superimposed small bilateral pleural effusions. Assessment/Plan:    Active Hospital Problems    Diagnosis     Acute respiratory failure with hypoxia ([facility] [J96.01]     COPD with acute exacerbation (HCC) [J44.1]     Alcohol withdrawal ([facility] [F10.239]     Tobacco abuse [Z72.0]     HTN (hypertension) [I10]     Pneumonia due to COVID-19 virus [U07.1, J12.82]      1. Wean O2 via NC as tolerated  2. Continue Solumedrol 40 mg IV q12h  3. Continue Lokelma for hyperkalemia until K < 4.5  4. CIWA Ativan  5. Off Zosyn for PNA  6. Stop Losartan for hyperkalemia; start Coreg 6.25 mg PO bid for HTN and titrate up as tolerated. 7. Changed Lovenox 30 mg SQ bid  8. Continue Duoneb  9. Passed SLP eval  10. PT/OT eval show ok for home, no needs    DVT Prophylaxis: Lovenox 100 mg SQ bid  Diet: ADULT DIET; Regular  ADULT ORAL NUTRITION SUPPLEMENT; Breakfast, Lunch, Dinner; Standard High Calorie/High Protein Oral Supplement  Code Status: Full Code    PT/OT Eval Status: Following    Dispo - Home no needs, home O2 arrangement when < 4 L    Discussed with patient, ICU nursing, CM and RT. Discussed with daughter at bedside. CM is making arrangements for home O2 with company that is closer to his home (his daughter uses same company for her own O2). Home as soon as stable on 4 L NC (with exertion).     Lina Valencia MD

## 2022-02-06 NOTE — PROGRESS NOTES
Pt's oxygen requirements have improved significantly overnight. Now on 5L with oxygen saturation of 95%. Plan of care reviewed with patient for need of home O2 eval before being able to discharge. Patient states understanding.

## 2022-02-06 NOTE — CARE COORDINATION
Discharge Planning:    Patient's Daughter sitting outside hospital room.  (CM) spoke with Daughter who informed, when the patient is discharged she has an oxygen tank at home that goes up to 5L that is hers to assist the patient in returning home. Daughter stated that the oxygen company that she uses in [facility] is [employer]. CM spoke with Dr. Rut Adams who stated that the patient's discharge has been cancelled for today due to the patient's home oxygen evaluation resulting with the patient needing 8L on exertion. CM called patient on his cell phone ([phone]) and spoke with him due Covid isolation procedures. Patient confirmed that CM speaking with his Daughter is acceptable. Patient confirmed that he is agreeable to returning home upon discharge. Patient agreeable to CM making a referral to [facility] for his oxygen needs upon discharge. Patient agreeable to [facility] scheduling non-emergency transport so that his oxygen needs are regulated, if needed upon discharge. CM called [facility] and [address] [place] [address] ([phone]) and spoke with the answering service. Answering service staff stated they would have on-call staff call CM for referral.      NICK Craven, [facility] -   [phone]    Electronically signed by ANTOINETTE Fair on 2/6/2022 at 3:59 PM        Update at [phone]:  CM received a voicemal from [place] [address] staff, Juide Ricketts ([phone]). CM called Judie Ricketts who stated that the patient is in their system, but is not set up with services. CM provided patient's current 02 needs of Nasal Cannula 4.5L/min continuous. Judie Ricketts stated that he would pass this information along. CM agreed to call the [facility] ([phone]) on patient's discharge day to provide patient's updated 02 needs and to obtain the fax number to send the patient's information to.     NICK Craven, [facility] -   [phone]    Electronically signed by ANTOINETTE Childress on 2/6/2022 at 4:36 PM

## 2022-02-06 NOTE — ACP (ADVANCE CARE PLANNING)
Advanced Care Planning Note. Purpose of Encounter: Advanced care planning in light of COVID 23  Parties In Attendance: Patient, daughter  Decisional Capacity: Yes  Subjective: Patient is coughing  Objective: Cr 0.7  Goals of Care Determination: Patient wants full support (CPR, vent, surgery, HD, trach, PEG)  Plan:  IV steroids. Wean O2 as tolerated. Home O2 upon DC  Code Status: Full code   Time spent on Advanced care Plannin minutes  Advanced Care Planning Documents: Completed advanced directives on chart, daughter is the POA.     Jenny Ha MD  2022 4:00 PM

## 2022-02-06 NOTE — PROGRESS NOTES
Occupational Therapy   Occupational Therapy Initial Assessment and Discharge Summary       Date: 2022   Patient Name: Pranav Smith  MRN: [id number]     : 1950    Date of Service: 2022    Discharge Recommendations: Pranav Smith scored a 24/24 on the AM-PAC ADL Inpatient form. At this time, no further OT is recommended upon discharge due to indep with ADLs and mobility. Recommend patient returns to prior setting with prior services. OT Equipment Recommendations  Equipment Needed: Yes  Other: Patient currently on 4.5L of O2 NC. Patient would benefit from a pulse ox to monitor home O2 sats    Assessment   Assessment: Patient indep with ADLs and transfers. Currently on 4.5L of O2 NC. Patient was not on oxygen prior to admission  Treatment Diagnosis: Debility due to COVID pneumonia  Prognosis: Good  Decision Making: Low Complexity  History: Indep ADLs and IADLs, drives, wife recently had 2 stents place in heart, granddaughter lives next door  Exam: Indep bed mobility, indep ADLs, 4.5L of O2 NC, sats above 90%  Assistance / Modification: Stable presentation  OT Education: OT Role  Patient Education: Patient verbalized understanding  Barriers to Learning: Hearing  No Skilled OT: Independent with functional mobility; Independent with ADL's;At baseline function; Safe to return home; No OT goals identified  REQUIRES OT FOLLOW UP: No  Activity Tolerance  Activity Tolerance: Patient Tolerated treatment well  Safety Devices  Safety Devices in place: Yes  Type of devices: All fall risk precautions in place;Nurse notified; Bed alarm in place; Left in bed;Call light within reach  Restraints  Initially in place: No           Patient Diagnosis(es): There were no encounter diagnoses. has no past medical history on file. has no past surgical history on file.     Treatment Diagnosis: Debility due to COVID pneumonia      Restrictions  Restrictions/Precautions  Restrictions/Precautions: Isolation,Fall Risk (High fall risk, COVID+ isolation)  Position Activity Restriction  Other position/activity restrictions: 65 yo M with COPD, unvaccinated against COVID, ongoing tobacco abuse, alcohol abuse, GERD, HTN was directly admitted to ICU for COVID 19 PNA with acute respiratory failure with hypoxia. Started on IV Decadron and given Actemra. Remains on AirVo. Followed by CCM. Started on Precedex gtt for possible alcohol withdrawal.  Changed to IV Solumedrol on 2/3/22. Started on IV Vanco and Zosyn for PNA. Subjective   General  Chart Reviewed: Yes  Patient assessed for rehabilitation services?: Yes  Family / Caregiver Present: No  Diagnosis: COVID pneumonia  Subjective  Subjective: Patient supine in bed, hard of hearing. On 4.5L of O2, sats above 90% when checked multiple times  General Comment  Comments: Patient reports he and his wife are both unvaccinated. Reports wife just had 2 stents placed in heart 3 months ago. Patient Currently in Pain: Denies  Vital Signs  Pulse: 72  Heart Rate Source: Monitor  Resp: 18  BP: 96/61  BP Location: Right upper arm  MAP (mmHg): 74  Patient Position: Turns self  Level of Consciousness: Alert (0)  Patient Currently in Pain: Denies        Social/Functional History  Social/Functional History  Lives With: Spouse  Type of Home: Trailer  Home Layout: One level  Home Access: Stairs to enter with rails  Entrance Stairs - Number of Steps: 3 steps to enter into trailer  Entrance Stairs - Rails: Right  Bathroom Shower/Tub: Walk-in shower,Shower chair without back  Bathroom Toilet: Standard  Bathroom Accessibility: Walker accessible  ADL Assistance: Independent  Homemaking Responsibilities: No  Ambulation Assistance: Independent  Transfer Assistance: Independent  Active : Yes  Occupation: Retired  Type of occupation: Retired   Leisure & Hobbies: Fish and hunt  IADL Comments: Wife performs  Additional Comments: PTA patient indep with ADLs. Reports no falls in last 6 months. Granddaughter lives next door       Objective   Vision: Impaired  Vision Exceptions: Wears glasses for reading  Hearing: Exceptions to [facility]  Hearing Exceptions: Hard of hearing/hearing concerns    Orientation  Overall Orientation Status: Within Normal Limits  Observation/Palpation  Posture: Fair  Observation: Mild kyphosis  Balance  Sitting Balance: Independent  Standing Balance: Independent  Standing Balance  Time: @ 3 minutes  Activity: Ambulation in room and bathroom. 55 feet total  Comment: Indep, no loss of balance, 4.5L of O2 NC  Functional Mobility  Functional - Mobility Device: No device  Activity: To/from bathroom  Assist Level: Independent  Toilet Transfers  Toilet - Technique: Ambulating  Equipment Used: Standard toilet  Toilet Transfer: Independent  ADL  Feeding: Independent  Grooming: Independent  LE Dressing: Independent  Toileting: Independent  Additional Comments: Alexandro phillip pullup attends, alexandro socks, indep ambulation in room without device. On 4.5L of O2 NC, sats above 90% when checked multiple times. Tone RUE  RUE Tone: Normotonic  Tone LUE  LUE Tone: Normotonic  Coordination  Movements Are Fluid And Coordinated: Yes        Transfers  Sit to stand: Independent  Stand to sit: Independent  Vision - Basic Assessment  Prior Vision: Wears glasses only for reading  Vision Comments: Reports recent cataract surgery. Cognition  Overall Cognitive Status: WNL  Perception  Overall Perceptual Status: WFL     Sensation  Overall Sensation Status: WNL        LUE AROM (degrees)  LUE AROM : WNL  Left Hand AROM (degrees)  Left Hand AROM: WNL  RUE AROM (degrees)  RUE AROM : WNL  Right Hand AROM (degrees)  Right Hand AROM: WNL  LUE Strength  Gross LUE Strength: WNL  RUE Strength  Gross RUE Strength:  WNL                   Plan   Plan  Times per week: no further OT indicated    G-Code     OutComes Score                                                  AM-PAC Score        AM-PAC Inpatient Daily Activity Raw Score: 24 (02/06/22 1424)  AM-PAC Inpatient ADL T-Scale Score : 57.54 (02/06/22 1424)  ADL Inpatient CMS 0-100% Score: 0 (02/06/22 1424)  ADL Inpatient CMS G-Code Modifier : [address] [place] [address] (02/06/22 1424)    Goals  Short term goals  Time Frame for Short term goals: No acute care goals indicated.  Patient indep with ADLs and mobility  Patient Goals   Patient goals : Go home       Therapy Time   Individual Concurrent Group Co-treatment   Time In 1351         Time Out 1416         Minutes 25              Timed Code Treatment Minutes:  10 Minutes    Total Treatment Minutes:  [address], OTR/L [address]

## 2022-02-06 NOTE — PROGRESS NOTES
Physical Therapy    Facility/Department: [address]  Initial Assessment    NAME: Morgan Maldonado  : 1950  MRN: [id number]    Date of Service: 2022  Morgan Maldonado scored a 24/24 on the AM-PAC short mobility form. At this time, no further PT is recommended upon discharge due to patient independent with functional mobility. Recommend patient returns to prior setting with prior services. Discharge Recommendations:  2-3 sessions per week   PT Equipment Recommendations  Equipment Needed: No  Discussed with patient recommendation for pulse ox at discharge to monitor SpO2 levels     Assessment   Body structures, Functions, Activity limitations: Decreased endurance  Assessment: Patient IND with all functional mobility. SpO2 of 94% at start of session on 4.5L NC, 90-91% after ambulation which quickly improved to 93%. Will D/C from acute PT. Treatment Diagnosis: Generalized Weakness  Prognosis: Excellent  Decision Making: Low Complexity  History: As noted above  Exam: Bed mobility, transfers, gait training, MMT  Clinical Presentation: Stable  PT Education: Goals;PT Role;Plan of Care;Transfer Training;General Safety;Equipment  Patient Education: Verbalized understanding  Barriers to Learning: Health literacy  REQUIRES PT FOLLOW UP: No  Activity Tolerance  Activity Tolerance: Patient Tolerated treatment well       Patient Diagnosis(es): There were no encounter diagnoses. has no past medical history on file. has no past surgical history on file. Restrictions  Restrictions/Precautions  Restrictions/Precautions: Isolation,Fall Risk (High fall risk, COVID+ isolation)  Position Activity Restriction  Other position/activity restrictions: 67 yo M with COPD, unvaccinated against COVID, ongoing tobacco abuse, alcohol abuse, GERD, HTN was directly admitted to ICU for COVID 19 PNA with acute respiratory failure with hypoxia. Started on IV Decadron and given Actemra. Remains on AirVo. Followed by CCM. Started on Precedex gtt for possible alcohol withdrawal.  Changed to IV Solumedrol on 2/3/22. Started on IV Vanco and Zosyn for PNA. Vision/Hearing  Vision: Impaired  Vision Exceptions: Wears glasses for reading  Hearing: Exceptions to [facility]  Hearing Exceptions: Hard of hearing/hearing concerns     Subjective  General  Chart Reviewed: Yes  Patient assessed for rehabilitation services?: Yes  Response To Previous Treatment: Not applicable  Family / Caregiver Present: Yes  Follows Commands: Within Functional Limits  General Comment  Comments: Patient supine in bed at start of session  Subjective  Subjective: Patient agreeable to PT treatment session  Pain Screening  Patient Currently in Pain: Denies  Vital Signs  Patient Currently in Pain: Denies       Orientation  Orientation  Overall Orientation Status: Within Normal Limits  Social/Functional History  Social/Functional History  Lives With: Spouse  Type of Home: Trailer  Home Layout: One level  Home Access: Stairs to enter with rails  Entrance Stairs - Number of Steps: 3 steps to enter into trailer  Entrance Stairs - Rails: Right  Bathroom Shower/Tub: Walk-in shower,Shower chair without back  Bathroom Toilet: Standard  Bathroom Accessibility: Walker accessible  ADL Assistance: Independent  Homemaking Responsibilities: No  Ambulation Assistance: Independent  Transfer Assistance: Independent  Active : Yes  Occupation: Retired  Type of occupation: Retired   Leisure & Hobbies: Fish and hunt  IADL Comments: Wife performs  Additional Comments: PTA patient indep with ADLs. Reports no falls in last 6 months.  Granddaughter lives next door  Cognition   Cognition  Overall Cognitive Status: WNL    Objective     Observation/Palpation  Posture: Fair  Observation: Mild kyphosis       Strength RLE  Strength RLE: WFL  Strength LLE  Strength LLE: WFL  Tone RLE  RLE Tone: Normotonic  Tone LLE  LLE Tone: Normotonic  Sensation  Overall Sensation Status: WNL  Bed mobility  Supine to Sit: Independent  Sit to Supine: Independent  Transfers  Sit to Stand: Independent  Stand to sit: Independent  Ambulation  Ambulation?: Yes  Ambulation 1  Surface: level tile  Device: No Device  Assistance: Independent  Gait Deviations: None  Distance: 55  Comments: Steady gait throughout  Stairs/Curb  Stairs?: No              Plan   Plan  Plan Comment: D/C from acute PT  Safety Devices  Type of devices:  All fall risk precautions in place,Call light within reach,Gait belt,Left in bed,Nurse notified  Restraints  Initially in place: No    AM-PAC Score  AM-PAC Inpatient Mobility Raw Score : 24 (02/06/22 1431)  AM-PAC Inpatient T-Scale Score : 61.14 (02/06/22 1431)  Mobility Inpatient CMS 0-100% Score: 0 (02/06/22 1431)  Mobility Inpatient CMS G-Code Modifier : [address] (02/06/22 1431)  Goals  Patient Goals   Patient goals : Go home    Therapy Time   Individual Concurrent Group Co-treatment   Time In 1351         Time Out 1416         Minutes 25         Timed Code Treatment Minutes: [facility]       MACHELLE Loera PT, DPT [id number]

## 2022-02-06 NOTE — PROGRESS NOTES
Patient was evaluated today for the diagnosis of COVID 19. I entered a DME order for home oxygen because the diagnosis and testing requires the patient to have supplemental oxygen. Condition will improve or be benefited by oxygen use. The patient is  able to perform good mobility in a home setting and therefore does require the use of a portable oxygen system. The need for this equipment was discussed with the patient and he understands and is in agreement.

## 2022-02-07 LAB
ANION GAP SERPL CALCULATED.3IONS-SCNC: 7 MMOL/L (ref 3–16)
BUN BLDV-MCNC: 28 MG/DL (ref 7–20)
CALCIUM SERPL-MCNC: 9.1 MG/DL (ref 8.3–10.6)
CHLORIDE BLD-SCNC: 96 MMOL/L (ref 99–110)
CO2: 30 MMOL/L (ref 21–32)
CREAT SERPL-MCNC: 0.7 MG/DL (ref 0.8–1.3)
GFR AFRICAN AMERICAN: >60
GFR NON-AFRICAN AMERICAN: >60
GLUCOSE BLD-MCNC: 124 MG/DL (ref 70–99)
HCT VFR BLD CALC: 45.9 % (ref 40.5–52.5)
HEMOGLOBIN: 15.1 G/DL (ref 13.5–17.5)
MAGNESIUM: 2.3 MG/DL (ref 1.8–2.4)
MCH RBC QN AUTO: 31.2 PG (ref 26–34)
MCHC RBC AUTO-ENTMCNC: 32.9 G/DL (ref 31–36)
MCV RBC AUTO: 94.7 FL (ref 80–100)
PDW BLD-RTO: 14.7 % (ref 12.4–15.4)
PHOSPHORUS: 3 MG/DL (ref 2.5–4.9)
PLATELET # BLD: 236 K/UL (ref 135–450)
PMV BLD AUTO: 10.1 FL (ref 5–10.5)
POTASSIUM SERPL-SCNC: 5.2 MMOL/L (ref 3.5–5.1)
PROCALCITONIN: 0.18 NG/ML (ref 0–0.15)
RBC # BLD: 4.85 M/UL (ref 4.2–5.9)
SODIUM BLD-SCNC: 133 MMOL/L (ref 136–145)
WBC # BLD: 11.2 K/UL (ref 4–11)

## 2022-02-07 PROCEDURE — 2700000000 HC OXYGEN THERAPY PER DAY

## 2022-02-07 PROCEDURE — 92526 ORAL FUNCTION THERAPY: CPT

## 2022-02-07 PROCEDURE — 6360000002 HC RX W HCPCS: Performed by: INTERNAL MEDICINE

## 2022-02-07 PROCEDURE — 6370000000 HC RX 637 (ALT 250 FOR IP): Performed by: INTERNAL MEDICINE

## 2022-02-07 PROCEDURE — 84100 ASSAY OF PHOSPHORUS: CPT

## 2022-02-07 PROCEDURE — 2580000003 HC RX 258: Performed by: INTERNAL MEDICINE

## 2022-02-07 PROCEDURE — 94761 N-INVAS EAR/PLS OXIMETRY MLT: CPT

## 2022-02-07 PROCEDURE — 94640 AIRWAY INHALATION TREATMENT: CPT

## 2022-02-07 PROCEDURE — 1200000000 HC SEMI PRIVATE

## 2022-02-07 PROCEDURE — 36415 COLL VENOUS BLD VENIPUNCTURE: CPT

## 2022-02-07 PROCEDURE — 84145 PROCALCITONIN (PCT): CPT

## 2022-02-07 PROCEDURE — 85027 COMPLETE CBC AUTOMATED: CPT

## 2022-02-07 PROCEDURE — 80048 BASIC METABOLIC PNL TOTAL CA: CPT

## 2022-02-07 PROCEDURE — 83735 ASSAY OF MAGNESIUM: CPT

## 2022-02-07 RX ADMIN — IPRATROPIUM BROMIDE AND ALBUTEROL SULFATE 1 AMPULE: .5; 3 SOLUTION RESPIRATORY (INHALATION) at 13:20

## 2022-02-07 RX ADMIN — IPRATROPIUM BROMIDE AND ALBUTEROL SULFATE 1 AMPULE: .5; 3 SOLUTION RESPIRATORY (INHALATION) at 08:56

## 2022-02-07 RX ADMIN — PANTOPRAZOLE SODIUM 40 MG: 40 TABLET, DELAYED RELEASE ORAL at 05:54

## 2022-02-07 RX ADMIN — METHYLPREDNISOLONE SODIUM SUCCINATE 40 MG: 40 INJECTION, POWDER, FOR SOLUTION INTRAMUSCULAR; INTRAVENOUS at 05:54

## 2022-02-07 RX ADMIN — IPRATROPIUM BROMIDE AND ALBUTEROL SULFATE 1 AMPULE: .5; 3 SOLUTION RESPIRATORY (INHALATION) at 21:31

## 2022-02-07 RX ADMIN — Medication 10 ML: at 08:33

## 2022-02-07 RX ADMIN — CARVEDILOL 6.25 MG: 6.25 TABLET, FILM COATED ORAL at 08:32

## 2022-02-07 RX ADMIN — ENOXAPARIN SODIUM 30 MG: 30 INJECTION SUBCUTANEOUS at 08:32

## 2022-02-07 RX ADMIN — BUDESONIDE 500 MCG: 0.5 SUSPENSION RESPIRATORY (INHALATION) at 08:56

## 2022-02-07 RX ADMIN — ARFORMOTEROL TARTRATE 15 MCG: 15 SOLUTION RESPIRATORY (INHALATION) at 21:31

## 2022-02-07 RX ADMIN — ENOXAPARIN SODIUM 30 MG: 30 INJECTION SUBCUTANEOUS at 21:14

## 2022-02-07 RX ADMIN — BUDESONIDE 500 MCG: 0.5 SUSPENSION RESPIRATORY (INHALATION) at 21:31

## 2022-02-07 RX ADMIN — SODIUM ZIRCONIUM CYCLOSILICATE 5 G: 5 POWDER, FOR SUSPENSION ORAL at 13:25

## 2022-02-07 RX ADMIN — SODIUM ZIRCONIUM CYCLOSILICATE 5 G: 5 POWDER, FOR SUSPENSION ORAL at 10:12

## 2022-02-07 RX ADMIN — SODIUM ZIRCONIUM CYCLOSILICATE 5 G: 5 POWDER, FOR SUSPENSION ORAL at 21:13

## 2022-02-07 RX ADMIN — METHYLPREDNISOLONE SODIUM SUCCINATE 40 MG: 40 INJECTION, POWDER, FOR SOLUTION INTRAMUSCULAR; INTRAVENOUS at 17:05

## 2022-02-07 RX ADMIN — Medication 10 ML: at 21:14

## 2022-02-07 RX ADMIN — CARVEDILOL 6.25 MG: 6.25 TABLET, FILM COATED ORAL at 17:05

## 2022-02-07 ASSESSMENT — PAIN SCALES - GENERAL
PAINLEVEL_OUTOF10: 0

## 2022-02-07 NOTE — CARE COORDINATION
Discharge Planning:     (CM) called and spoke with patient's Daughter, Jermaine Miranda, who reported the patient's address is:    [address], [address] [facility]    CM and Daughter discussed patient not discharging today, possibly in the next day or so. CM informed that [facility] will be updated with patient's current address, as patient's address in chart is not the same. CM called [facility] and [address] [facility] ([phone]) to inform of patient's updated address. CM spoke with the answering service who stated that the office is closed and asked that CM call back tomorrow morning to inform. CM agreed to this plan of action.       Wendelin Hashimoto MSW, NICK, [place] [address]  Social Work -   [phone]    Electronically signed by Wendelin Hashimoto, MSW on 2/7/2022 at 5:09 PM

## 2022-02-07 NOTE — PROGRESS NOTES
Speech Language Pathology  Dysphagia Treatment Note    Name:  January Chen  :   1950  Medical Diagnosis:  Pneumonia due to COVID-19 virus [U07.1, J12.82]  Treatment Diagnosis: Oropharyngeal Dysphagia  Pain level: denies     Diet level prior to treatment: Regular texture diet with thin liquids   Tolerance of Current Diet Level: Per chart review, no documented difficulties with current diet level noted. Assessment of Texture Tolerance:  -Impressions: Pt was positioned upright in chair on 4L O2 via nasal cannula. He denied any difficulty with current diet level. Trials of regular solids and thin liquids were provided to assess swallow function. Pt demonstrated adequate mastication and bolus propulsion. Swallow timing appeared adequate. No overt clinical s/s of aspiration/penetration assessed this date. Overall swallow function appears grossly WNL this date. No further follow-up appears indicated at this time    Diet and Treatment Recommendations 2022:  Regular texture diet with thin liquids     Compensatory strategies: Upright as possible with all PO intake , Eat/feed slowly    Dysphagia Goals:   Pt will functionally tolerate recommended diet with no overt clinical s/s of aspiration (ongoing 2022)   Pt will demonstrate understanding of aspiration risk and precautions via education/demonstration with occasional prompting (ongoing 2022)      Plan:  Discharge from SLP caseload at this time. Patient/Family Education:Education given to the Pt and nurse, who verbalized understanding    Discharge Recommendations:  No further SLP follow-up appears indicated at this time    Timed Code Treatment:  0 minutes     Total Treatment Time:  15 minutes     If patient discharges prior to next session this note will serve as a discharge summary.      Signature: Delio Stewart, [address]  Speech Language Pathologist

## 2022-02-07 NOTE — PROGRESS NOTES
[facility] PROGRESS NOTE    2/7/2022 8:52 AM        Name: Reg Key . Admitted: 1/29/2022  Primary Care Provider: Tasha Ruelas DO (Tel: [phone])                        Subjective:  . No acute events overnight. Resting well. Pain control. Diet ok. Labs reviewed  Denies any chest pain sob.      Reviewed interval ancillary notes    Current Medications  sodium chloride (OCEAN, BABY AYR) 0.65 % nasal spray 1 spray, PRN  carvedilol (COREG) tablet 6.25 mg, BID WC  methylPREDNISolone sodium (SOLU-MEDROL) injection 40 mg, Q12H  ipratropium-albuterol (DUONEB) nebulizer solution 1 ampule, TID  enoxaparin (LOVENOX) injection 30 mg, BID  sodium zirconium cyclosilicate (LOKELMA) oral suspension 5 g, TID  budesonide (PULMICORT) nebulizer suspension 500 mcg, BID  Arformoterol Tartrate (BROVANA) nebulizer solution 15 mcg, BID  LORazepam (ATIVAN) tablet 1 mg, Q1H PRN   Or  LORazepam (ATIVAN) injection 1 mg, Q1H PRN   Or  LORazepam (ATIVAN) tablet 2 mg, Q1H PRN   Or  LORazepam (ATIVAN) injection 2 mg, Q1H PRN   Or  LORazepam (ATIVAN) tablet 3 mg, Q1H PRN   Or  LORazepam (ATIVAN) injection 3 mg, Q1H PRN   Or  LORazepam (ATIVAN) tablet 4 mg, Q1H PRN   Or  LORazepam (ATIVAN) injection 4 mg, Q1H PRN  melatonin tablet 3 mg, Nightly PRN  albuterol sulfate  (90 Base) MCG/ACT inhaler 2 puff, Q4H PRN  pantoprazole (PROTONIX) tablet 40 mg, QAM AC  sodium chloride flush 0.9 % injection 5-40 mL, 2 times per day  sodium chloride flush 0.9 % injection 5-40 mL, PRN  0.9 % sodium chloride infusion, PRN  ondansetron (ZOFRAN-ODT) disintegrating tablet 4 mg, Q8H PRN   Or  ondansetron (ZOFRAN) injection 4 mg, Q6H PRN  polyethylene glycol (GLYCOLAX) packet 17 g, Daily PRN  acetaminophen (TYLENOL) tablet 650 mg, Q6H PRN   Or  acetaminophen (TYLENOL) suppository 650 mg, Q6H PRN  albuterol (PROVENTIL) nebulizer solution 2.5 mg, Q4H PRN        Objective:  /84   Pulse 60   Temp 96.5 °F (35.8 °C) (Temporal)   Resp 13   Ht 6' 2\" (1.88 m)   Wt 227 lb (103 kg)   SpO2 91%   BMI 29.15 kg/m²     Intake/Output Summary (Last 24 hours) at 2/7/2022 0852  Last data filed at 2/7/2022 0849  Gross per 24 hour   Intake 1160 ml   Output 750 ml   Net 410 ml      Wt Readings from Last 3 Encounters:   02/07/22 227 lb (103 kg)       General appearance:  Appears comfortable  Eyes: Sclera clear. Pupils equal.  ENT: Moist oral mucosa. Trachea midline, no adenopathy, neck supple. Cardiovascular: Regular rhythm, normal S1, S2. No murmur. No edema in lower extremities  Respiratory: Not using accessory muscles. Good inspiratory effort. Clear to auscultation bilaterally, no wheeze or crackles. GI: Abdomen soft, no tenderness, not distended, normal bowel sounds  Musculoskeletal: No deformity, ROM WNL. Neurology: CN 2-12 grossly intact. No speech or motor deficits  Psych: Normal affect. Alert and oriented in time, place and person  Skin: Warm, dry, normal turgor    Labs and Tests:  CBC:   Recent Labs     02/05/22  0547 02/06/22  0303   WBC 15.9* 10.2   HGB 14.3 13.8   HCT 42.9 41.1   MCV 93.9 94.8    184     BMP:    Recent Labs     02/05/22  0547 02/06/22  0303   * 133*   K 5.3* 5.2*   CL 98* 99   CO2 31 28   BUN 28* 27*   CREATININE 0.6* 0.7*   GLUCOSE 119* 120*     Hepatic: No results for input(s): AST, ALT, ALB, BILITOT, ALKPHOS in the last 72 hours. Problem List  Principal Problem:    Pneumonia due to COVID-19 virus  Active Problems:    Acute respiratory failure with hypoxia (HCC)    COPD with acute exacerbation (HCC)    Alcohol withdrawal ([facility] [address]    Tobacco abuse    HTN (hypertension)  Resolved Problems:    * No resolved hospital problems. *       Assessment & Plan:   1.  COVID-19 pneumonia: Continue droplet precautions, continue supplemental oxygen currently on 4 L per nasal

## 2022-02-07 NOTE — PLAN OF CARE
Problem: Skin Integrity:  Goal: Will show no infection signs and symptoms  Description: Will show no infection signs and symptoms  Outcome: Ongoing  Goal: Absence of new skin breakdown  Description: Absence of new skin breakdown  Outcome: Ongoing     Problem: Airway Clearance - Ineffective  Goal: Achieve or maintain patent airway  Outcome: Ongoing     Problem: Gas Exchange - Impaired  Goal: Absence of hypoxia  Outcome: Ongoing  Goal: Promote optimal lung function  Outcome: Ongoing

## 2022-02-07 NOTE — PROGRESS NOTES
Pt was up to bathroom then chair without oxygen then sat in chair. Denied SOB color good, O2 sat 81%. Oxygen put back in place and came up to 91% on 4 L . PT not needed. Failed home o2 eval. See case manger report.

## 2022-02-07 NOTE — PROGRESS NOTES
Repeat O2 eval not yet completed, per SW may not be until tomorrow.  Discharge likely pushed back until tomorrow

## 2022-02-07 NOTE — PROGRESS NOTES
Pt complained of stinging/ burning sensation on RUSSEL at site of SubQ lovenox. Ice pack applied. Pt voiced moderate relief. Pt educated on medications and isolation precautions. Urinal at bedside, 94% on 4L. Call light at bedside. Safety measures in place.

## 2022-02-08 VITALS
OXYGEN SATURATION: 97 % | RESPIRATION RATE: 16 BRPM | DIASTOLIC BLOOD PRESSURE: 93 MMHG | SYSTOLIC BLOOD PRESSURE: 127 MMHG | HEIGHT: 74 IN | WEIGHT: 227 LBS | BODY MASS INDEX: 29.13 KG/M2 | HEART RATE: 76 BPM | TEMPERATURE: 97.6 F

## 2022-02-08 LAB
ANION GAP SERPL CALCULATED.3IONS-SCNC: 5 MMOL/L (ref 3–16)
BUN BLDV-MCNC: 35 MG/DL (ref 7–20)
CALCIUM SERPL-MCNC: 8.7 MG/DL (ref 8.3–10.6)
CHLORIDE BLD-SCNC: 98 MMOL/L (ref 99–110)
CO2: 32 MMOL/L (ref 21–32)
CREAT SERPL-MCNC: 0.7 MG/DL (ref 0.8–1.3)
GFR AFRICAN AMERICAN: >60
GFR NON-AFRICAN AMERICAN: >60
GLUCOSE BLD-MCNC: 120 MG/DL (ref 70–99)
HCT VFR BLD CALC: 40.6 % (ref 40.5–52.5)
HEMOGLOBIN: 13.5 G/DL (ref 13.5–17.5)
MCH RBC QN AUTO: 31.5 PG (ref 26–34)
MCHC RBC AUTO-ENTMCNC: 33.2 G/DL (ref 31–36)
MCV RBC AUTO: 95.1 FL (ref 80–100)
PDW BLD-RTO: 14.4 % (ref 12.4–15.4)
PLATELET # BLD: 201 K/UL (ref 135–450)
PMV BLD AUTO: 10.3 FL (ref 5–10.5)
POTASSIUM SERPL-SCNC: 5 MMOL/L (ref 3.5–5.1)
PROCALCITONIN: 0.12 NG/ML (ref 0–0.15)
RBC # BLD: 4.27 M/UL (ref 4.2–5.9)
SODIUM BLD-SCNC: 135 MMOL/L (ref 136–145)
WBC # BLD: 11.3 K/UL (ref 4–11)

## 2022-02-08 PROCEDURE — 80048 BASIC METABOLIC PNL TOTAL CA: CPT

## 2022-02-08 PROCEDURE — 94680 O2 UPTK RST&XERS DIR SIMPLE: CPT

## 2022-02-08 PROCEDURE — 6360000002 HC RX W HCPCS: Performed by: INTERNAL MEDICINE

## 2022-02-08 PROCEDURE — 6370000000 HC RX 637 (ALT 250 FOR IP): Performed by: INTERNAL MEDICINE

## 2022-02-08 PROCEDURE — 2580000003 HC RX 258: Performed by: INTERNAL MEDICINE

## 2022-02-08 PROCEDURE — 85027 COMPLETE CBC AUTOMATED: CPT

## 2022-02-08 PROCEDURE — 2700000000 HC OXYGEN THERAPY PER DAY

## 2022-02-08 PROCEDURE — 84145 PROCALCITONIN (PCT): CPT

## 2022-02-08 PROCEDURE — 36415 COLL VENOUS BLD VENIPUNCTURE: CPT

## 2022-02-08 PROCEDURE — 94640 AIRWAY INHALATION TREATMENT: CPT

## 2022-02-08 PROCEDURE — 94761 N-INVAS EAR/PLS OXIMETRY MLT: CPT

## 2022-02-08 RX ORDER — BUDESONIDE AND FORMOTEROL FUMARATE DIHYDRATE 160; 4.5 UG/1; UG/1
2 AEROSOL RESPIRATORY (INHALATION) 2 TIMES DAILY
Qty: 30.6 G | Refills: 1 | Status: SHIPPED | OUTPATIENT
Start: 2022-02-08

## 2022-02-08 RX ORDER — IPRATROPIUM BROMIDE AND ALBUTEROL SULFATE 2.5; .5 MG/3ML; MG/3ML
1 SOLUTION RESPIRATORY (INHALATION) 2 TIMES DAILY
Status: DISCONTINUED | OUTPATIENT
Start: 2022-02-08 | End: 2022-02-08 | Stop reason: HOSPADM

## 2022-02-08 RX ORDER — PREDNISONE 20 MG/1
TABLET ORAL
Qty: 11 TABLET | Refills: 0 | Status: SHIPPED | OUTPATIENT
Start: 2022-02-08

## 2022-02-08 RX ADMIN — Medication 10 ML: at 09:21

## 2022-02-08 RX ADMIN — SODIUM ZIRCONIUM CYCLOSILICATE 5 G: 5 POWDER, FOR SUSPENSION ORAL at 09:20

## 2022-02-08 RX ADMIN — ENOXAPARIN SODIUM 30 MG: 30 INJECTION SUBCUTANEOUS at 09:20

## 2022-02-08 RX ADMIN — BUDESONIDE 500 MCG: 0.5 SUSPENSION RESPIRATORY (INHALATION) at 09:11

## 2022-02-08 RX ADMIN — IPRATROPIUM BROMIDE AND ALBUTEROL SULFATE 1 AMPULE: .5; 3 SOLUTION RESPIRATORY (INHALATION) at 09:10

## 2022-02-08 RX ADMIN — METHYLPREDNISOLONE SODIUM SUCCINATE 40 MG: 40 INJECTION, POWDER, FOR SOLUTION INTRAMUSCULAR; INTRAVENOUS at 06:07

## 2022-02-08 RX ADMIN — CARVEDILOL 6.25 MG: 6.25 TABLET, FILM COATED ORAL at 09:20

## 2022-02-08 RX ADMIN — PANTOPRAZOLE SODIUM 40 MG: 40 TABLET, DELAYED RELEASE ORAL at 06:06

## 2022-02-08 ASSESSMENT — PAIN SCALES - GENERAL
PAINLEVEL_OUTOF10: 0

## 2022-02-08 NOTE — CARE COORDINATION
Discharge Planning:     ([facility]) called and spoke with [facility] and [address] [place] [address] ([phone]) staff member, Blanca Holloway, who informed that she is emailing CM a document that the patient's Physician needs to sign (no HIPPA information disclosed on sheet). Blanca Holloway confirmed that she will be contacting the patient's Spouse and Daughter to have the patient's oxygen at his home today before he possible discharges at 4:00pm.    CM faxed [facility] ([phone]) patient's Face Sheet and Oxygen stats over patient's duration of stay. NICK Whalen, [facility] -   [phone]    Electronically signed by ANTOINETTE Bryan on 2/8/2022 at 9:17 AM        Update at :  CM received a call from Blanca Holloway at [place] [address]. Blanca Holloway confirmed that they would have the patient's oxygen needs met when he arrives home tonight.  [facility] faxed signed Physician form back to Blanca Holloway, per request.      NICK Whalen, [facility] -   [phone]    Electronically signed by ANTOINETTE Bryan on 2/8/2022 at 3:33 PM

## 2022-02-08 NOTE — RT PROTOCOL NOTE
RT Inhaler-Nebulizer Bronchodilator Protocol Note    There is a bronchodilator order in the chart from a provider indicating to follow the RT Bronchodilator Protocol and there is an Initiate RT Inhaler-Nebulizer Bronchodilator Protocol order as well (see protocol at bottom of note). CXR Findings:  No results found. The findings from the last RT Protocol Assessment were as follows:   History Pulmonary Disease: Smoker 15 pack years or more  Respiratory Pattern: Regular pattern and RR 12-20 bpm  Breath Sounds: Slightly diminished and/or crackles  Cough: Weak, productive  Indication for Bronchodilator Therapy: Wheezing associated with pulm disorder  Bronchodilator Assessment Score: 5    Aerosolized bronchodilator medication orders have been revised according to the RT Inhaler-Nebulizer Bronchodilator Protocol below. Respiratory Therapist to perform RT Therapy Protocol Assessment initially then follow the protocol. Repeat RT Therapy Protocol Assessment PRN for score 0-3 or on second treatment, BID, and PRN for scores above 3. No Indications - adjust the frequency to every 6 hours PRN wheezing or bronchospasm, if no treatments needed after 48 hours then discontinue using Per Protocol order mode. If indication present, adjust the RT bronchodilator orders based on the Bronchodilator Assessment Score as indicated below. Use Inhaler orders unless patient has one or more of the following: on home nebulizer, not able to hold breath for 10 seconds, is not alert and oriented, cannot activate and use MDI correctly, or respiratory rate 25 breaths per minute or more, then use the equivalent nebulizer order(s) with same Frequency and PRN reasons based on the score. If a patient is on this medication at home then do not decrease Frequency below that used at home.     0-3 - enter or revise RT bronchodilator order(s) to equivalent RT Bronchodilator order with Frequency of every 4 hours PRN for wheezing or increased work of breathing using Per Protocol order mode. 4-6 - enter or revise RT Bronchodilator order(s) to two equivalent RT bronchodilator orders with one order with BID Frequency and one order with Frequency of every 4 hours PRN wheezing or increased work of breathing using Per Protocol order mode. 7-10 - enter or revise RT Bronchodilator order(s) to two equivalent RT bronchodilator orders with one order with TID Frequency and one order with Frequency of every 4 hours PRN wheezing or increased work of breathing using Per Protocol order mode. 11-13 - enter or revise RT Bronchodilator order(s) to one equivalent RT bronchodilator order with QID Frequency and an Albuterol order with Frequency of every 4 hours PRN wheezing or increased work of breathing using Per Protocol order mode. Greater than 13 - enter or revise RT Bronchodilator order(s) to one equivalent RT bronchodilator order with every 4 hours Frequency and an Albuterol order with Frequency of every 2 hours PRN wheezing or increased work of breathing using Per Protocol order mode. RT to enter RT Home Evaluation for COPD & MDI Assessment order using Per Protocol order mode.     Electronically signed by Murl Spurling, RCP on 2/8/2022 at 11:42 AM

## 2022-02-08 NOTE — PROGRESS NOTES
VSS. PIV removed. Assisted pt with dressing. 1L NC at rest. Denies pain. Transport [facility] [place] to take home.

## 2022-02-08 NOTE — PROGRESS NOTES
02/08/22 1345   Resting (Room Air)   SpO2 89   HR 74   Resting (On O2)   SpO2 90   HR 70   O2 Device Nasal cannula   O2 Flow Rate (l/min) 1 l/min   During Walk (On O2)   SpO2 90   HR 84   O2 Device Nasal cannula   O2 Flow Rate (l/min) 4 l/min   Need Additional O2 Flow Rate Rows Yes   O2 Flow Rate (l/min) 5 l/min   O2 Saturation 91   O2 Flow Rate (l/min) 6 l/min   O2 Saturation 92   Walk/Assistance Device Walker   Rate of Dyspnea 0   After Walk   Does the Patient Qualify for Home O2 Yes

## 2022-02-08 NOTE — DISCHARGE SUMMARY
distention. Trachea midline. Respiratory:  Normal respiratory effort. Clear to auscultation, bilaterally without Rales/Wheezes/Rhonchi. Cardiovascular:  Regular rate and rhythm with normal S1/S2 without murmurs, rubs or gallops. Abdomen: Soft, non-tender, non-distended with normal bowel sounds. Musculoskeletal:  No clubbing, cyanosis or edema bilaterally. Full range of motion without deformity. Skin: Skin color, texture, turgor normal.  No rashes or lesions. Neurologic:  Neurovascularly intact without any focal sensory/motor deficits. Cranial nerves: II-XII intact, grossly non-focal.  Psychiatric:  Alert and oriented, thought content appropriate, normal insight  Capillary Refill: Brisk,< 3 seconds   Peripheral Pulses: +2 palpable, equal bilaterally       Labs: For convenience and continuity at follow-up the following most recent labs are provided:      CBC:    Lab Results   Component Value Date    WBC 11.3 02/08/2022    HGB 13.5 02/08/2022    HCT 40.6 02/08/2022     02/08/2022       Renal:    Lab Results   Component Value Date     02/08/2022    K 5.0 02/08/2022    K 4.6 01/30/2022    CL 98 02/08/2022    CO2 32 02/08/2022    BUN 35 02/08/2022    CREATININE 0.7 02/08/2022    CALCIUM 8.7 02/08/2022    PHOS 3.0 02/07/2022         Significant Diagnostic Studies    Radiology:   XR CHEST PORTABLE   Final Result   Worsening multifocal bilateral atypical pneumonia. XR CHEST PORTABLE   Final Result   Slight worsening of parenchymal infiltrates greater towards the right. Findings likely represent pneumonia. Continued follow-up recommended. XR CHEST PORTABLE   Final Result   Areas of dense airspace opacity involving right mid to lower lung zones and   left lower lung zone concerning for multifocal pneumonia. There may also be   some superimposed small bilateral pleural effusions.                 Consults:     IP CONSULT TO PULMONOLOGY  IP CONSULT TO FINANCIAL COUNSELOR  IP CONSULT TO SOCIAL WORK    Disposition:  Home with [facility]     Condition at Discharge: Stable    Discharge Instructions/Follow-up:  PCP In 1 week     Code Status:  Full Code     Activity: activity as tolerated    Diet: regular diet      Discharge Medications:     Current Discharge Medication List           Details   budesonide-formoterol (SYMBICORT) 160-4.5 MCG/ACT AERO Inhale 2 puffs into the lungs 2 times daily  Qty: 30.6 g, Refills: 1      tiotropium (SPIRIVA RESPIMAT) 2.5 MCG/ACT AERS inhaler Inhale 2 puffs into the lungs daily  Qty: 1 each, Refills: 0      Multiple Vitamin (MVI, CELEBRATE, CHEWABLE TABLET) Take 1 tablet by mouth daily  Qty: 30 tablet, Refills: 0      predniSONE (DELTASONE) 20 MG tablet 2 tabs daily for 3 days , then 1 tab daily for 3 days and then 1/2 tab daily for 3 days  Qty: 11 tablet, Refills: 0              Details   albuterol sulfate  (90 Base) MCG/ACT inhaler Inhale 2 puffs into the lungs every 4 hours as needed for Wheezing  Qty: 18 g, Refills: 0              Details   IRBESARTAN PO Take 300 mg by mouth daily      omeprazole (PRILOSEC) 20 MG delayed release capsule Take 20 mg by mouth daily             Time Spent on discharge is more than 30 minutes in the examination, evaluation, counseling and review of medications and discharge plan. Signed:    Samantha Romano MD   2/8/2022      Thank you Ariana Brody DO for the opportunity to be involved in this patient's care. If you have any questions or concerns please feel free to contact me at [phone].

## 2022-02-08 NOTE — PROGRESS NOTES
Nutrition Assessment     Type and Reason for Visit: Reassess    Nutrition Recommendations/Plan:   No new nutrition rec's @ this time. Nutrition Assessment:  PO intake improving with intake greater than 75% most meals per RN. Continues to receive Ensure with meals to promote adequate intake as well. Possible d/c later today. Will con't to monitor for further needs as identified. Malnutrition Assessment:  Malnutrition Status: Insufficient data    Estimated Daily Nutrient Needs:  Energy (kcal): 2625- 3150 (25-30 kcal/105 kg); Weight Used for Energy Requirements:  Current     Protein (g): 126- 210 g(1.2-2.0g/105kg); Weight Used for Protein Requirements:  Current        Fluid (ml/day):  ; Weight Used for Fluid Requirements:  1 ml/kcal      Nutrition Related Findings: -3.6L; No edema; LBM 2/7. Na 135      Current Nutrition Therapies:    ADULT DIET;  Regular  ADULT ORAL NUTRITION SUPPLEMENT; Breakfast, Lunch, Dinner; Standard High Calorie/High Protein Oral Supplement    Anthropometric Measures:  · Height: 6' 2\" (188 cm)  · Current Body Wt: 227 lb (103 kg)   · BMI: 29.1    Nutrition Diagnosis:   No nutrition diagnosis at this time    Nutrition Interventions:   Food and/or Nutrient Delivery:  Continue Current Diet,Continue Oral Nutrition Supplement  Nutrition Education/Counseling:  Education not indicated   Coordination of Nutrition Care:  Continue to monitor while inpatient    Goals:  po intake at last 50% of meals & supplements       Nutrition Monitoring and Evaluation:   Behavioral-Environmental Outcomes:  None Identified   Food/Nutrient Intake Outcomes:  Food and Nutrient Intake,Supplement Intake  Physical Signs/Symptoms Outcomes:  None Identified     Discharge Planning:    No discharge needs at this time     Electronically signed by Valerie Bella RD, LD on 2/8/22 at 2:05 PM EST    Contact: [phone]

## 2022-02-08 NOTE — DISCHARGE INSTR - COC
Continuity of Care Form    Patient Name: Surekha Lopez   :  1950  MRN:  [id number]    Admit date:  2022  Discharge date:  ***    Code Status Order: Full Code   Advance Directives:      Admitting Physician:  Urmila Bello MD  PCP: Sarah Guzmán DO    Discharging Nurse: [facility] Unit/Room#: J5I-7028/3302-87  Discharging Unit Phone Number: ***    Emergency Contact:   Extended Emergency Contact Information  Primary Emergency Contact: [facility]  Address: [address] [facility] Phone: [phone]  Relation: Spouse  Secondary Emergency Contact: [facility]  [employer] Phone: [phone]  Relation: Child  Preferred language: Cam Mu    Past Surgical History:  History reviewed. No pertinent surgical history. Immunization History: There is no immunization history on file for this patient.     Active Problems:  Patient Active Problem List   Diagnosis Code    Pneumonia due to COVID-19 virus U07.1, J12.82    Acute respiratory failure with hypoxia (HCC) J96.01    COPD with acute exacerbation (HCC) J44.1    Alcohol withdrawal ([facility] [address] F10.239    Tobacco abuse Z72.0    HTN (hypertension) I10       Isolation/Infection:   Isolation            Droplet  Droplet  Droplet Plus          Patient Infection Status       Infection Onset Added Last Indicated Last Indicated By Review Planned Expiration Resolved Resolved By    COVID-19 22 COVID-19 22              Nurse Assessment:  Last Vital Signs: BP 97/79   Pulse 74   Temp 96.7 °F (35.9 °C) (Temporal)   Resp 18   Ht 6' 2\" (1.88 m)   Wt 227 lb (103 kg)   SpO2 97%   BMI 29.15 kg/m²     Last documented pain score (0-10 scale): Pain Level: 0  Last Weight:   Wt Readings from Last 1 Encounters:   22 227 lb (103 kg)     Mental Status:  {IP PT MENTAL STATUS:}    IV Access:  { IRENA IV ACCESS:756002908}    Nursing Mobility/ADLs:  Walking   {CHP DME VTER:154915123}  Transfer  {CHP DME CCCL:774859715}  Bathing  {CHP DME NMSH:432102396}  Dressing  {CHP DME XOHD:957412373}  Toileting  {CHP DME EKKI:668883900}  Feeding  {P DME UUCT:049223618}  Med Admin  {[facility] DME SZSP:312557061}  Med Delivery   { IRENA MED Delivery:941761583}    Wound Care Documentation and Therapy:        Elimination:  Continence: Bowel: {YES / }  Bladder: {YES / BZ:79184}  Urinary Catheter: {Urinary Catheter:396818006}   Colostomy/Ileostomy/Ileal Conduit: {YES / LP:74076}       Date of Last BM: ***    Intake/Output Summary (Last 24 hours) at 2022 0938  Last data filed at 2022 0612  Gross per 24 hour   Intake --   Output 1050 ml   Net -1050 ml     I/O last 3 completed shifts: In: 440 [P.O.:420;  I.V.:20]  Out: 1400 [Urine:1400]    Safety Concerns:     [address] IRENA Safety Concerns:623732805}    Impairments/Disabilities:      {[facility] Impairments/Disabilities:767130602}    Nutrition Therapy:  Current Nutrition Therapy:   { IRENA Diet List:983437820}    Routes of Feeding: {[facility] DME Other Feedings:321385987}  Liquids: {Slp liquid thickness:28886}  Daily Fluid Restriction: {[facility] DME Yes amt example:114037960}  Last Modified Barium Swallow with Video (Video Swallowing Test): {Done Not Done PZWV:021589640}    Treatments at the Time of Hospital Discharge:   Respiratory Treatments: ***  Oxygen Therapy:  {Therapy; copd oxygen:38856}  Ventilator:    {[facility] Vent RXGZ:093244219}    Rehab Therapies: {THERAPEUTIC INTERVENTION:4084990611}  Weight Bearing Status/Restrictions: {[facility] Weight Bearin}  Other Medical Equipment (for information only, NOT a DME order):  {EQUIPMENT:570312253}  Other Treatments: ***    Patient's personal belongings (please select all that are sent with patient):  {[facility] DME Belongings:951244495}    RN SIGNATURE:  {Esignature:156380728}    CASE MANAGEMENT/SOCIAL WORK SECTION    Inpatient Status Date: 2022    Readmission Risk Assessment Score:  Readmission Risk              Risk of Unplanned Readmission:  14           Discharging with oxygen services through:    [address] [employer]  Phone: [phone]  Fax: [phone]    / signature: Electronically signed by ANTOINETTE Patricio on 2/8/2022 at 9:41 AM      PHYSICIAN SECTION    Prognosis: {Prognosis:4638152305}    Condition at Discharge: [facility] Patient Condition:356576366}    Rehab Potential (if transferring to Rehab): {Prognosis:5665758568}    Recommended Labs or Other Treatments After Discharge: ***    Physician Certification: I certify the above information and transfer of Yemi Wolfe  is necessary for the continuing treatment of the diagnosis listed and that he requires {Admit to Appropriate Level of Care:12110} for {GREATER/LESS:151789021} 30 days.      Update Admission H&P: {CHP DME Changes in USIUK:454139891}    PHYSICIAN SIGNATURE:  {Esignature:908441466}

## 2022-02-08 NOTE — DISCHARGE SUMMARY
Gustavo Franco RCP   Respiratory Therapist   Specialty:  Respiratory Therapy   Progress Notes       Signed   Date of Service:  2/8/2022  1:58 PM                 Signed              Show:Clear all  []Manual[x]Template[]Copied    Added by:  [x]Chavo Bear RCP      []Bairon for details         02/08/22 1345   Resting (Room Air)   SpO2 89   HR 74   Resting (On O2)   SpO2 90   HR 70   O2 Device Nasal cannula   O2 Flow Rate (l/min) 1 l/min   During Walk (On O2)   SpO2 90   HR 84   O2 Device Nasal cannula   O2 Flow Rate (l/min) 4 l/min   Need Additional O2 Flow Rate Rows Yes   O2 Flow Rate (l/min) 5 l/min   O2 Saturation 91   O2 Flow Rate (l/min) 6 l/min   O2 Saturation 92   Walk/Assistance Device Walker   Rate of Dyspnea 0   After Walk   Does the Patient Qualify for Home O2 Yes

## 2022-02-08 NOTE — CARE COORDINATION
Discharge Planning:     (CM) spoke with patient who reported his discharge home address is:    [address] [facility], [address] [facility] called [address] and scheduled patient's transport at 4:00pm.    Transportation scheduled in anticipation of patient's discharge today. Patient's Nurse and Physician to inform CM if discharge is not appropriate today so that patient's transportation can be rescheduled.       Dewain Holter MSW, LISW-S, [facility] -   [phone]    Electronically signed by Dewain Holter, MSW on 2/8/2022 at 8:27 AM

## 2022-02-09 ENCOUNTER — CARE COORDINATION (OUTPATIENT)
Dept: CASE MANAGEMENT | Age: 72
End: 2022-02-09

## 2022-02-09 NOTE — CARE COORDINATION
[facility] Initial Follow Up Call    Call within 2 business days of discharge: Yes    Patient: Huy Escalante Patient : 1950   MRN: [id number]  Reason for Admission: ETOH withdrawal, COPD, COVID PNA  Discharge Date: 22 RARS: Readmission Risk Score: 10.3 ( )    First attempt at 24 hour discharge call, no answer, \"number has been disconnected or is no longer in service\". CTN did not reach out to another family member listed - HIPAA form is not on file, patient does not have a [facility]ABIL[facility]ATION [facility] provider and he is not active in My Chart. CTN will resolve episode and remain available.                 Kimmie Medina RN

## 2023-12-21 ENCOUNTER — OUTSIDE SERVICES (OUTPATIENT)
Dept: SURGERY | Age: 73
End: 2023-12-21
Payer: MEDICARE

## 2023-12-21 DIAGNOSIS — K64.4 EXTERNAL HEMORRHOIDS: Primary | ICD-10-CM

## 2023-12-21 PROCEDURE — 99203 OFFICE O/P NEW LOW 30 MIN: CPT | Performed by: SURGERY

## 2023-12-28 NOTE — PROGRESS NOTES
Outside service performed at [facility] with date of service 12/21/2023. Clinic note located in the media section.

## 2024-03-04 ENCOUNTER — TELEPHONE (OUTPATIENT)
Dept: SURGERY | Age: 74
End: 2024-03-04

## 2024-03-04 NOTE — TELEPHONE ENCOUNTER
I called [facility] pharmacy and left message on their voicemail for refill.

## 2024-03-21 ENCOUNTER — OUTSIDE SERVICES (OUTPATIENT)
Dept: SURGERY | Age: 74
End: 2024-03-21
Payer: MEDICARE

## 2024-03-21 DIAGNOSIS — R15.9 INCONTINENCE OF FECES, UNSPECIFIED FECAL INCONTINENCE TYPE: ICD-10-CM

## 2024-03-21 DIAGNOSIS — K64.9 HEMORRHOIDS, UNSPECIFIED HEMORRHOID TYPE: ICD-10-CM

## 2024-03-21 DIAGNOSIS — R63.4 WEIGHT LOSS: Primary | ICD-10-CM

## 2024-03-21 PROCEDURE — 99214 OFFICE O/P EST MOD 30 MIN: CPT | Performed by: SURGERY

## 2024-03-26 NOTE — PROGRESS NOTES
[facility]      S:   Patient presents for follow up of hemorrhoids.  He reports persistent weight loss.  He had unremarkable retroperitoneal ultrasound through Dr. Ashton.  He describes hemorrhoids and fecal incontinence.  Colonoscopy was unremarkable other than prolapse / hemorrhoids and benign polyps with Dr. Petty 12/2023.      O:   Comfortable.  No distress.   Chest CTA   Heart regular   Abdomen soft.  Non distended.  No tender.     Anorectal with bulky hemorrhoids.  Prostate is firm.                        A:     Encounter Diagnoses   Name Primary?    Weight loss Yes    Hemorrhoids, unspecified hemorrhoid type     Incontinence of feces, unspecified fecal incontinence type             P:   CT will be ordered to further his workup for unexplained weight loss.  He has bulky hemorrhoids, however, these do not account for the incontinence and certainly not the weight loss.

## 2024-04-01 ENCOUNTER — TELEPHONE (OUTPATIENT)
Dept: SURGERY | Age: 74
End: 2024-04-01

## 2024-04-01 DIAGNOSIS — K64.9 HEMORRHOIDS, UNSPECIFIED HEMORRHOID TYPE: Primary | ICD-10-CM

## 2024-04-01 DIAGNOSIS — R15.9 INCONTINENCE OF FECES, UNSPECIFIED FECAL INCONTINENCE TYPE: ICD-10-CM

## 2024-04-01 NOTE — TELEPHONE ENCOUNTER
Tell him CT was normal.  I am requesting that he see our specialty surgeon for his complex hemorrhoids and fecal incontinence.  Referral placed to Dr. Thao.  Follow up with me as needed.

## 2024-04-05 ENCOUNTER — OFFICE VISIT (OUTPATIENT)
Dept: SURGERY | Age: 74
End: 2024-04-05
Payer: MEDICARE

## 2024-04-05 VITALS
WEIGHT: 205 LBS | RESPIRATION RATE: 16 BRPM | SYSTOLIC BLOOD PRESSURE: 103 MMHG | DIASTOLIC BLOOD PRESSURE: 71 MMHG | TEMPERATURE: 97.5 F | BODY MASS INDEX: 26.32 KG/M2 | HEART RATE: 65 BPM

## 2024-04-05 DIAGNOSIS — K64.9 HEMORRHOIDS, UNSPECIFIED HEMORRHOID TYPE: Primary | ICD-10-CM

## 2024-04-05 PROCEDURE — 3078F DIAST BP <80 MM HG: CPT | Performed by: SURGERY

## 2024-04-05 PROCEDURE — 1123F ACP DISCUSS/DSCN MKR DOCD: CPT | Performed by: SURGERY

## 2024-04-05 PROCEDURE — 99203 OFFICE O/P NEW LOW 30 MIN: CPT | Performed by: SURGERY

## 2024-04-05 PROCEDURE — 3074F SYST BP LT 130 MM HG: CPT | Performed by: SURGERY

## 2024-04-05 RX ORDER — SODIUM CHLORIDE 0.9 % (FLUSH) 0.9 %
5-40 SYRINGE (ML) INJECTION EVERY 12 HOURS SCHEDULED
OUTPATIENT
Start: 2024-04-05

## 2024-04-05 RX ORDER — METOPROLOL SUCCINATE 25 MG/1
25 TABLET, EXTENDED RELEASE ORAL DAILY
COMMUNITY
Start: 2022-12-13

## 2024-04-05 RX ORDER — AMLODIPINE BESYLATE 10 MG/1
10 TABLET ORAL DAILY
COMMUNITY

## 2024-04-05 RX ORDER — SODIUM CHLORIDE 0.9 % (FLUSH) 0.9 %
5-40 SYRINGE (ML) INJECTION PRN
OUTPATIENT
Start: 2024-04-05

## 2024-04-05 RX ORDER — ASPIRIN 81 MG/1
81 TABLET ORAL DAILY
COMMUNITY
Start: 2022-12-13

## 2024-04-05 RX ORDER — SODIUM CHLORIDE 9 MG/ML
INJECTION, SOLUTION INTRAVENOUS PRN
OUTPATIENT
Start: 2024-04-05

## 2024-04-05 RX ORDER — ROFLUMILAST 500 UG/1
500 TABLET ORAL DAILY
COMMUNITY

## 2024-04-05 RX ORDER — WARFARIN SODIUM 5 MG/1
5 TABLET ORAL DAILY
COMMUNITY

## 2024-04-05 RX ORDER — SODIUM CHLORIDE 9 MG/ML
INJECTION, SOLUTION INTRAVENOUS CONTINUOUS
OUTPATIENT
Start: 2024-04-05

## 2024-04-05 NOTE — PROGRESS NOTES
Subjective:     Patient is a 73 y.o. man with fecal incontinence and hemorrhoids     The patient is referred by Dr. Fredi Winslow MD. Results of consultation will be shared via electronic medical record;    HPI: Mr. Aldana reports bothersome hemorrhoids and fecal incontinence. The hemorrhoids are large and painful and come out with BM. He had a colonoscopy Dec 2023 with Dr. Petty which was normal aside from benign polyps and prolapsing hemorrhoids . Given Anusol HC in past which did not help.     Patient Active Problem List    Diagnosis Date Noted    Acute respiratory failure with hypoxia (HCC) 02/04/2022    COPD with acute exacerbation (HCC) 02/04/2022    Alcohol withdrawal (HCC) 02/04/2022    Tobacco abuse 02/04/2022    HTN (hypertension) 02/04/2022    Pneumonia due to COVID-19 virus 01/29/2022       Objective:     Vitals:    04/05/24 1343   BP: 103/71   Pulse: 65   Resp: 16   Temp: 97.5 °F (36.4 °C)     GEN: appears well, no distress, appears stated age  PSYCH: normal mood, normal affect  NECK: no neck masses, trachea midline  ENT: moist oral mucosa; anicteric  SKIN: no rash or jaundice  CV: regular heart rate and rhythm  PULM: normal respiratory effort, no wheezing  GI: soft non tender abdomen. Normal bowel sounds  RECTAL: examined with chaperone Lisa Landrum RN. Large fungating prolapsing internal external hemorrhoids   EXT/NEURO: normal gait, strength/sensation grossly intact in all extremities    CT report 4-2-24 in media: \"no abnormal findings to explain patient's weight loss\"    Assessment:     Hemorrhoids  Fecal incontinence    Plan:     The RBA of excisional hemorrhoidectomy were reviewed. Discussed risk of bleeding given hemorrhoids are large blood vessels and that a mild amount of bleeding postoperatively is expected but that significant bleeding can occur and warrant a return trip to the operating room. Discussed small risk of infection. Discussed because of this goal of hemorrhoid operation is

## 2024-04-10 ENCOUNTER — PREP FOR PROCEDURE (OUTPATIENT)
Dept: SURGERY | Age: 74
End: 2024-04-10

## 2024-04-10 ENCOUNTER — TELEPHONE (OUTPATIENT)
Dept: SURGERY | Age: 74
End: 2024-04-10

## 2024-04-10 PROBLEM — K64.9 HEMORRHOIDS: Status: ACTIVE | Noted: 2024-04-10

## 2024-04-10 NOTE — TELEPHONE ENCOUNTER
Patient has been scheduled for:    Procedure:Hemorrhoidectomy   Date:5/14/24  Time:10:00 AM   Arrival:8:00 AM   Hospital:[facility]     ASA?:Coumadin 5 day hold, Aspirin 7 day hold   Prep?NPO    Pre-op? PCP    Post-op Appt? Najma 6/7/24 at 11:30 AM     Patient advised they will need a .    Orders faxed to surgery scheduling.    Medication sent to Pharmacy:     Stents or ostomy marking?    Instructions have been mailed/emailed to:  [address] [place] [id number]    Added to outlook calendar

## 2024-05-13 ENCOUNTER — TELEPHONE (OUTPATIENT)
Dept: SURGERY | Age: 74
End: 2024-05-13

## 2024-05-13 NOTE — TELEPHONE ENCOUNTER
I have placed a reminder call to patient for upcoming procedure.    Did you speak directly to patient or leave a voicemail? Spoke to patient    Prep?     NPO and holding coumadin    Must have a  that is over the age of 18.   Must be a friend or family member that can be responsible for signing them out after surgery.    Arrive at the main entrance [facility] at 8am

## 2024-05-14 ENCOUNTER — TELEPHONE (OUTPATIENT)
Dept: SURGERY | Age: 74
End: 2024-05-14

## 2024-05-14 NOTE — TELEPHONE ENCOUNTER
Received the OK, Case message sent to add it on.     Spoke with patient and gave him the update to arrive at 715am on 05/16/2024

## 2024-05-14 NOTE — TELEPHONE ENCOUNTER
Hazel, patient's wife, called in stating that the patient's procedure was cancelled for today 5/14/2024    Hazel would like to get the patient's surgery rescheduled    Please contact Hazel [phone]

## 2024-05-14 NOTE — TELEPHONE ENCOUNTER
I would like to get the patient scheduled for Thursday 05/16/2024 @915am arrival at 715am. I am waiting to get the OK from Dr. Thao. I have notified the patients wife and will call back when I get the confirmation.

## 2024-05-14 NOTE — PROGRESS NOTES
5/14/2024 315pm Called office of Ev Dee [phone] for letter that was put  in epic yesterday 5/13 (telephone encounter states \"patient is considered a high risk for surgery. Letter placed in [facility]\") regarding cardiac clearance; spoke with  and she will fax. -db*CARDIAC CLEARANCE IN  MEDIA 5/15-DB    5/14/2024 330pm Spoke with patient; surgery was rescheduled to 5/16/24. He denies any changes in health history or medications; reminded to take amlodipine and metoprolol day of surgery with a sip of water and bring albuterol inhaler day of surgery of which he acknowledges. -db

## 2024-05-15 ENCOUNTER — TELEPHONE (OUTPATIENT)
Dept: SURGERY | Age: 74
End: 2024-05-15

## 2024-05-15 NOTE — TELEPHONE ENCOUNTER
Spoke with patient to confirm arrival time tomorrow of 7:00am with a . Patient knows to be NPO after midnight. Confirmed that he has been holding aspirin and warfarin.

## 2024-05-16 ENCOUNTER — ANESTHESIA (OUTPATIENT)
Dept: OPERATING ROOM | Age: 74
End: 2024-05-16
Payer: MEDICARE

## 2024-05-16 ENCOUNTER — ANESTHESIA EVENT (OUTPATIENT)
Dept: OPERATING ROOM | Age: 74
End: 2024-05-16
Payer: MEDICARE

## 2024-05-16 ENCOUNTER — HOSPITAL ENCOUNTER (OUTPATIENT)
Age: 74
Setting detail: OBSERVATION
Discharge: HOME | End: 2024-05-16
Attending: SURGERY | Admitting: SURGERY
Payer: MEDICARE

## 2024-05-16 VITALS
RESPIRATION RATE: 18 BRPM | OXYGEN SATURATION: 95 % | DIASTOLIC BLOOD PRESSURE: 71 MMHG | BODY MASS INDEX: 26.96 KG/M2 | SYSTOLIC BLOOD PRESSURE: 108 MMHG | WEIGHT: 203.4 LBS | TEMPERATURE: 97.5 F | HEART RATE: 74 BPM | HEIGHT: 73 IN

## 2024-05-16 DIAGNOSIS — K64.9 HEMORRHOIDS: ICD-10-CM

## 2024-05-16 PROCEDURE — A4217 STERILE WATER/SALINE, 500 ML: HCPCS | Performed by: SURGERY

## 2024-05-16 PROCEDURE — 3700000000 HC ANESTHESIA ATTENDED CARE: Performed by: SURGERY

## 2024-05-16 PROCEDURE — 7100000001 HC PACU RECOVERY - ADDTL 15 MIN: Performed by: SURGERY

## 2024-05-16 PROCEDURE — 7100000000 HC PACU RECOVERY - FIRST 15 MIN: Performed by: SURGERY

## 2024-05-16 PROCEDURE — C9290 INJ, BUPIVACAINE LIPOSOME: HCPCS | Performed by: SURGERY

## 2024-05-16 PROCEDURE — 2580000003 HC RX 258: Performed by: SURGERY

## 2024-05-16 PROCEDURE — 6370000000 HC RX 637 (ALT 250 FOR IP)

## 2024-05-16 PROCEDURE — 3600000004 HC SURGERY LEVEL 4 BASE: Performed by: SURGERY

## 2024-05-16 PROCEDURE — 6360000002 HC RX W HCPCS

## 2024-05-16 PROCEDURE — 2709999900 HC NON-CHARGEABLE SUPPLY: Performed by: SURGERY

## 2024-05-16 PROCEDURE — 2500000003 HC RX 250 WO HCPCS

## 2024-05-16 PROCEDURE — 88304 TISSUE EXAM BY PATHOLOGIST: CPT

## 2024-05-16 PROCEDURE — 3600000014 HC SURGERY LEVEL 4 ADDTL 15MIN: Performed by: SURGERY

## 2024-05-16 PROCEDURE — 2580000003 HC RX 258: Performed by: ANESTHESIOLOGY

## 2024-05-16 PROCEDURE — 3700000001 HC ADD 15 MINUTES (ANESTHESIA): Performed by: SURGERY

## 2024-05-16 PROCEDURE — 46260 REMOVE IN/EX HEM GROUPS 2+: CPT | Performed by: SURGERY

## 2024-05-16 PROCEDURE — 6360000002 HC RX W HCPCS: Performed by: SURGERY

## 2024-05-16 PROCEDURE — 2580000003 HC RX 258

## 2024-05-16 PROCEDURE — 2500000003 HC RX 250 WO HCPCS: Performed by: SURGERY

## 2024-05-16 PROCEDURE — G0378 HOSPITAL OBSERVATION PER HR: HCPCS

## 2024-05-16 PROCEDURE — 6370000000 HC RX 637 (ALT 250 FOR IP): Performed by: SURGERY

## 2024-05-16 RX ORDER — SODIUM CHLORIDE 0.9 % (FLUSH) 0.9 %
5-40 SYRINGE (ML) INJECTION EVERY 12 HOURS SCHEDULED
Status: DISCONTINUED | OUTPATIENT
Start: 2024-05-16 | End: 2024-05-16 | Stop reason: HOSPADM

## 2024-05-16 RX ORDER — KETOROLAC TROMETHAMINE 30 MG/ML
INJECTION, SOLUTION INTRAMUSCULAR; INTRAVENOUS PRN
Status: DISCONTINUED | OUTPATIENT
Start: 2024-05-16 | End: 2024-05-16 | Stop reason: SDUPTHER

## 2024-05-16 RX ORDER — OXYCODONE HYDROCHLORIDE AND ACETAMINOPHEN 5; 325 MG/1; MG/1
1-2 TABLET ORAL EVERY 6 HOURS PRN
Qty: 42 TABLET | Refills: 0 | Status: SHIPPED | OUTPATIENT
Start: 2024-05-16 | End: 2024-05-23

## 2024-05-16 RX ORDER — FENTANYL CITRATE 50 UG/ML
INJECTION, SOLUTION INTRAMUSCULAR; INTRAVENOUS PRN
Status: DISCONTINUED | OUTPATIENT
Start: 2024-05-16 | End: 2024-05-16 | Stop reason: SDUPTHER

## 2024-05-16 RX ORDER — ARFORMOTEROL TARTRATE 15 UG/2ML
15 SOLUTION RESPIRATORY (INHALATION)
Status: DISCONTINUED | OUTPATIENT
Start: 2024-05-16 | End: 2024-05-16 | Stop reason: HOSPADM

## 2024-05-16 RX ORDER — NALOXONE HYDROCHLORIDE 0.4 MG/ML
INJECTION, SOLUTION INTRAMUSCULAR; INTRAVENOUS; SUBCUTANEOUS PRN
Status: DISCONTINUED | OUTPATIENT
Start: 2024-05-16 | End: 2024-05-16 | Stop reason: HOSPADM

## 2024-05-16 RX ORDER — DIPHENHYDRAMINE HYDROCHLORIDE 50 MG/ML
12.5 INJECTION INTRAMUSCULAR; INTRAVENOUS
Status: DISCONTINUED | OUTPATIENT
Start: 2024-05-16 | End: 2024-05-16 | Stop reason: HOSPADM

## 2024-05-16 RX ORDER — MAGNESIUM HYDROXIDE 1200 MG/15ML
LIQUID ORAL CONTINUOUS PRN
Status: DISCONTINUED | OUTPATIENT
Start: 2024-05-16 | End: 2024-05-16 | Stop reason: HOSPADM

## 2024-05-16 RX ORDER — EPINEPHRINE 1 MG/ML
INJECTION, SOLUTION, CONCENTRATE INTRAVENOUS PRN
Status: DISCONTINUED | OUTPATIENT
Start: 2024-05-16 | End: 2024-05-16 | Stop reason: SDUPTHER

## 2024-05-16 RX ORDER — ROCURONIUM BROMIDE 10 MG/ML
INJECTION, SOLUTION INTRAVENOUS PRN
Status: DISCONTINUED | OUTPATIENT
Start: 2024-05-16 | End: 2024-05-16 | Stop reason: SDUPTHER

## 2024-05-16 RX ORDER — TAMSULOSIN HYDROCHLORIDE 0.4 MG/1
0.4 CAPSULE ORAL NIGHTLY
Status: DISCONTINUED | OUTPATIENT
Start: 2024-05-16 | End: 2024-05-16 | Stop reason: HOSPADM

## 2024-05-16 RX ORDER — PANTOPRAZOLE SODIUM 40 MG/1
40 TABLET, DELAYED RELEASE ORAL
Status: DISCONTINUED | OUTPATIENT
Start: 2024-05-17 | End: 2024-05-16 | Stop reason: HOSPADM

## 2024-05-16 RX ORDER — NALOXONE HYDROCHLORIDE 0.4 MG/ML
INJECTION, SOLUTION INTRAMUSCULAR; INTRAVENOUS; SUBCUTANEOUS PRN
Status: DISCONTINUED | OUTPATIENT
Start: 2024-05-16 | End: 2024-05-16 | Stop reason: SDUPTHER

## 2024-05-16 RX ORDER — SODIUM CHLORIDE 9 MG/ML
INJECTION, SOLUTION INTRAVENOUS PRN
Status: DISCONTINUED | OUTPATIENT
Start: 2024-05-16 | End: 2024-05-16 | Stop reason: HOSPADM

## 2024-05-16 RX ORDER — ONDANSETRON 2 MG/ML
4 INJECTION INTRAMUSCULAR; INTRAVENOUS EVERY 6 HOURS PRN
Status: DISCONTINUED | OUTPATIENT
Start: 2024-05-16 | End: 2024-05-16 | Stop reason: HOSPADM

## 2024-05-16 RX ORDER — PROCHLORPERAZINE EDISYLATE 5 MG/ML
5 INJECTION INTRAMUSCULAR; INTRAVENOUS
Status: DISCONTINUED | OUTPATIENT
Start: 2024-05-16 | End: 2024-05-16 | Stop reason: HOSPADM

## 2024-05-16 RX ORDER — SODIUM CHLORIDE 9 MG/ML
INJECTION, SOLUTION INTRAVENOUS CONTINUOUS
Status: DISCONTINUED | OUTPATIENT
Start: 2024-05-16 | End: 2024-05-16 | Stop reason: HOSPADM

## 2024-05-16 RX ORDER — ACETAMINOPHEN 325 MG/1
650 TABLET ORAL EVERY 6 HOURS
Status: DISCONTINUED | OUTPATIENT
Start: 2024-05-16 | End: 2024-05-16 | Stop reason: HOSPADM

## 2024-05-16 RX ORDER — BUDESONIDE 0.5 MG/2ML
0.5 INHALANT ORAL
Status: DISCONTINUED | OUTPATIENT
Start: 2024-05-16 | End: 2024-05-16 | Stop reason: HOSPADM

## 2024-05-16 RX ORDER — SODIUM CHLORIDE 0.9 % (FLUSH) 0.9 %
5-40 SYRINGE (ML) INJECTION PRN
Status: DISCONTINUED | OUTPATIENT
Start: 2024-05-16 | End: 2024-05-16 | Stop reason: HOSPADM

## 2024-05-16 RX ORDER — POLYETHYLENE GLYCOL 3350 17 G/17G
17 POWDER, FOR SOLUTION ORAL DAILY
Status: DISCONTINUED | OUTPATIENT
Start: 2024-05-16 | End: 2024-05-16 | Stop reason: HOSPADM

## 2024-05-16 RX ORDER — LABETALOL HYDROCHLORIDE 5 MG/ML
10 INJECTION, SOLUTION INTRAVENOUS
Status: DISCONTINUED | OUTPATIENT
Start: 2024-05-16 | End: 2024-05-16 | Stop reason: HOSPADM

## 2024-05-16 RX ORDER — DOCUSATE SODIUM 100 MG/1
100 CAPSULE, LIQUID FILLED ORAL DAILY
Status: DISCONTINUED | OUTPATIENT
Start: 2024-05-16 | End: 2024-05-16 | Stop reason: HOSPADM

## 2024-05-16 RX ORDER — OXYCODONE HYDROCHLORIDE 5 MG/1
10 TABLET ORAL EVERY 4 HOURS PRN
Status: DISCONTINUED | OUTPATIENT
Start: 2024-05-16 | End: 2024-05-16 | Stop reason: HOSPADM

## 2024-05-16 RX ORDER — MEPERIDINE HYDROCHLORIDE 25 MG/ML
12.5 INJECTION INTRAMUSCULAR; INTRAVENOUS; SUBCUTANEOUS EVERY 5 MIN PRN
Status: DISCONTINUED | OUTPATIENT
Start: 2024-05-16 | End: 2024-05-16 | Stop reason: HOSPADM

## 2024-05-16 RX ORDER — MIDAZOLAM HYDROCHLORIDE 1 MG/ML
INJECTION INTRAMUSCULAR; INTRAVENOUS PRN
Status: DISCONTINUED | OUTPATIENT
Start: 2024-05-16 | End: 2024-05-16 | Stop reason: SDUPTHER

## 2024-05-16 RX ORDER — SODIUM CHLORIDE, SODIUM LACTATE, POTASSIUM CHLORIDE, CALCIUM CHLORIDE 600; 310; 30; 20 MG/100ML; MG/100ML; MG/100ML; MG/100ML
INJECTION, SOLUTION INTRAVENOUS CONTINUOUS
Status: ACTIVE | OUTPATIENT
Start: 2024-05-16 | End: 2024-05-16

## 2024-05-16 RX ORDER — LORAZEPAM 2 MG/ML
0.5 INJECTION INTRAMUSCULAR
Status: DISCONTINUED | OUTPATIENT
Start: 2024-05-16 | End: 2024-05-16 | Stop reason: HOSPADM

## 2024-05-16 RX ORDER — LIDOCAINE HYDROCHLORIDE 20 MG/ML
INJECTION, SOLUTION INTRAVENOUS PRN
Status: DISCONTINUED | OUTPATIENT
Start: 2024-05-16 | End: 2024-05-16 | Stop reason: SDUPTHER

## 2024-05-16 RX ORDER — HYDROMORPHONE HYDROCHLORIDE 1 MG/ML
0.5 INJECTION, SOLUTION INTRAMUSCULAR; INTRAVENOUS; SUBCUTANEOUS EVERY 5 MIN PRN
Status: DISCONTINUED | OUTPATIENT
Start: 2024-05-16 | End: 2024-05-16 | Stop reason: HOSPADM

## 2024-05-16 RX ORDER — DOCUSATE SODIUM 100 MG/1
100 CAPSULE, LIQUID FILLED ORAL 2 TIMES DAILY
Qty: 60 CAPSULE | Refills: 0 | Status: SHIPPED | OUTPATIENT
Start: 2024-05-16 | End: 2024-06-15

## 2024-05-16 RX ORDER — OXYCODONE HYDROCHLORIDE 5 MG/1
5 TABLET ORAL EVERY 4 HOURS PRN
Status: DISCONTINUED | OUTPATIENT
Start: 2024-05-16 | End: 2024-05-16 | Stop reason: HOSPADM

## 2024-05-16 RX ORDER — SODIUM CHLORIDE, SODIUM LACTATE, POTASSIUM CHLORIDE, CALCIUM CHLORIDE 600; 310; 30; 20 MG/100ML; MG/100ML; MG/100ML; MG/100ML
INJECTION, SOLUTION INTRAVENOUS CONTINUOUS
Status: DISCONTINUED | OUTPATIENT
Start: 2024-05-16 | End: 2024-05-16 | Stop reason: HOSPADM

## 2024-05-16 RX ORDER — ENOXAPARIN SODIUM 100 MG/ML
40 INJECTION SUBCUTANEOUS DAILY
Status: DISCONTINUED | OUTPATIENT
Start: 2024-05-17 | End: 2024-05-16 | Stop reason: HOSPADM

## 2024-05-16 RX ORDER — DEXAMETHASONE SODIUM PHOSPHATE 4 MG/ML
INJECTION, SOLUTION INTRA-ARTICULAR; INTRALESIONAL; INTRAMUSCULAR; INTRAVENOUS; SOFT TISSUE PRN
Status: DISCONTINUED | OUTPATIENT
Start: 2024-05-16 | End: 2024-05-16 | Stop reason: SDUPTHER

## 2024-05-16 RX ORDER — ALBUTEROL SULFATE 90 UG/1
2 AEROSOL, METERED RESPIRATORY (INHALATION) EVERY 4 HOURS PRN
Status: DISCONTINUED | OUTPATIENT
Start: 2024-05-16 | End: 2024-05-16 | Stop reason: HOSPADM

## 2024-05-16 RX ORDER — ONDANSETRON 2 MG/ML
INJECTION INTRAMUSCULAR; INTRAVENOUS PRN
Status: DISCONTINUED | OUTPATIENT
Start: 2024-05-16 | End: 2024-05-16 | Stop reason: SDUPTHER

## 2024-05-16 RX ORDER — FENTANYL CITRATE 50 UG/ML
25 INJECTION, SOLUTION INTRAMUSCULAR; INTRAVENOUS EVERY 5 MIN PRN
Status: DISCONTINUED | OUTPATIENT
Start: 2024-05-16 | End: 2024-05-16 | Stop reason: HOSPADM

## 2024-05-16 RX ORDER — BUPIVACAINE HYDROCHLORIDE AND EPINEPHRINE 5; 5 MG/ML; UG/ML
INJECTION, SOLUTION EPIDURAL; INTRACAUDAL; PERINEURAL PRN
Status: DISCONTINUED | OUTPATIENT
Start: 2024-05-16 | End: 2024-05-16 | Stop reason: HOSPADM

## 2024-05-16 RX ORDER — IPRATROPIUM BROMIDE AND ALBUTEROL SULFATE 2.5; .5 MG/3ML; MG/3ML
1 SOLUTION RESPIRATORY (INHALATION)
Status: DISCONTINUED | OUTPATIENT
Start: 2024-05-16 | End: 2024-05-16 | Stop reason: HOSPADM

## 2024-05-16 RX ORDER — PROPOFOL 10 MG/ML
INJECTION, EMULSION INTRAVENOUS PRN
Status: DISCONTINUED | OUTPATIENT
Start: 2024-05-16 | End: 2024-05-16 | Stop reason: SDUPTHER

## 2024-05-16 RX ORDER — ONDANSETRON 2 MG/ML
4 INJECTION INTRAMUSCULAR; INTRAVENOUS
Status: DISCONTINUED | OUTPATIENT
Start: 2024-05-16 | End: 2024-05-16 | Stop reason: HOSPADM

## 2024-05-16 RX ORDER — ONDANSETRON 4 MG/1
4 TABLET, ORALLY DISINTEGRATING ORAL EVERY 8 HOURS PRN
Status: DISCONTINUED | OUTPATIENT
Start: 2024-05-16 | End: 2024-05-16 | Stop reason: HOSPADM

## 2024-05-16 RX ADMIN — PROPOFOL 50 MG: 10 INJECTION, EMULSION INTRAVENOUS at 09:37

## 2024-05-16 RX ADMIN — DEXAMETHASONE SODIUM PHOSPHATE 8 MG: 4 INJECTION INTRA-ARTICULAR; INTRALESIONAL; INTRAMUSCULAR; INTRAVENOUS; SOFT TISSUE at 09:45

## 2024-05-16 RX ADMIN — FENTANYL CITRATE 50 MCG: 50 INJECTION, SOLUTION INTRAMUSCULAR; INTRAVENOUS at 10:42

## 2024-05-16 RX ADMIN — ROCURONIUM BROMIDE 50 MG: 10 INJECTION, SOLUTION INTRAVENOUS at 09:39

## 2024-05-16 RX ADMIN — FENTANYL CITRATE 25 MCG: 50 INJECTION, SOLUTION INTRAMUSCULAR; INTRAVENOUS at 10:11

## 2024-05-16 RX ADMIN — FENTANYL CITRATE 25 MCG: 50 INJECTION, SOLUTION INTRAMUSCULAR; INTRAVENOUS at 09:32

## 2024-05-16 RX ADMIN — PHENYLEPHRINE HYDROCHLORIDE 50 MCG: 10 INJECTION INTRAVENOUS at 10:20

## 2024-05-16 RX ADMIN — FENTANYL CITRATE 25 MCG: 50 INJECTION, SOLUTION INTRAMUSCULAR; INTRAVENOUS at 10:01

## 2024-05-16 RX ADMIN — ONDANSETRON 4 MG: 2 INJECTION INTRAMUSCULAR; INTRAVENOUS at 09:45

## 2024-05-16 RX ADMIN — PROPOFOL 40 MG: 10 INJECTION, EMULSION INTRAVENOUS at 10:01

## 2024-05-16 RX ADMIN — PHENYLEPHRINE HYDROCHLORIDE 200 MCG: 10 INJECTION INTRAVENOUS at 10:51

## 2024-05-16 RX ADMIN — FENTANYL CITRATE 50 MCG: 50 INJECTION, SOLUTION INTRAMUSCULAR; INTRAVENOUS at 10:28

## 2024-05-16 RX ADMIN — SODIUM CHLORIDE, PRESERVATIVE FREE 10 ML: 5 INJECTION INTRAVENOUS at 13:32

## 2024-05-16 RX ADMIN — DOCUSATE SODIUM 100 MG: 100 CAPSULE, LIQUID FILLED ORAL at 13:30

## 2024-05-16 RX ADMIN — PROPOFOL 30 MG: 10 INJECTION, EMULSION INTRAVENOUS at 09:38

## 2024-05-16 RX ADMIN — PHENYLEPHRINE HYDROCHLORIDE 200 MCG: 10 INJECTION INTRAVENOUS at 10:55

## 2024-05-16 RX ADMIN — LIDOCAINE HYDROCHLORIDE 100 MG: 20 INJECTION, SOLUTION INTRAVENOUS at 09:35

## 2024-05-16 RX ADMIN — PHENYLEPHRINE HYDROCHLORIDE 200 MCG: 10 INJECTION INTRAVENOUS at 10:34

## 2024-05-16 RX ADMIN — POLYETHYLENE GLYCOL 3350 17 G: 17 POWDER, FOR SOLUTION ORAL at 13:31

## 2024-05-16 RX ADMIN — MIDAZOLAM HYDROCHLORIDE 1 MG: 2 INJECTION, SOLUTION INTRAMUSCULAR; INTRAVENOUS at 09:30

## 2024-05-16 RX ADMIN — SODIUM CHLORIDE, POTASSIUM CHLORIDE, SODIUM LACTATE AND CALCIUM CHLORIDE: 600; 310; 30; 20 INJECTION, SOLUTION INTRAVENOUS at 12:27

## 2024-05-16 RX ADMIN — SUGAMMADEX 20 MG: 100 INJECTION, SOLUTION INTRAVENOUS at 10:40

## 2024-05-16 RX ADMIN — NALOXONE HYDROCHLORIDE 0.2 MG: 0.4 INJECTION, SOLUTION INTRAMUSCULAR; INTRAVENOUS; SUBCUTANEOUS at 10:58

## 2024-05-16 RX ADMIN — PHENYLEPHRINE HYDROCHLORIDE 100 MCG: 10 INJECTION INTRAVENOUS at 10:11

## 2024-05-16 RX ADMIN — EPINEPHRINE 10 MCG: 1 INJECTION, SOLUTION, CONCENTRATE INTRAVENOUS at 11:00

## 2024-05-16 RX ADMIN — KETOROLAC TROMETHAMINE 15 MG: 30 INJECTION, SOLUTION INTRAMUSCULAR; INTRAVENOUS at 10:36

## 2024-05-16 RX ADMIN — PHENYLEPHRINE HYDROCHLORIDE 100 MCG: 10 INJECTION INTRAVENOUS at 10:23

## 2024-05-16 RX ADMIN — FENTANYL CITRATE 25 MCG: 50 INJECTION, SOLUTION INTRAMUSCULAR; INTRAVENOUS at 09:35

## 2024-05-16 RX ADMIN — ACETAMINOPHEN 650 MG: 325 TABLET ORAL at 13:29

## 2024-05-16 RX ADMIN — SODIUM CHLORIDE, POTASSIUM CHLORIDE, SODIUM LACTATE AND CALCIUM CHLORIDE: 600; 310; 30; 20 INJECTION, SOLUTION INTRAVENOUS at 07:44

## 2024-05-16 RX ADMIN — EPINEPHRINE 5 MCG: 1 INJECTION, SOLUTION, CONCENTRATE INTRAVENOUS at 10:58

## 2024-05-16 ASSESSMENT — PAIN SCALES - GENERAL
PAINLEVEL_OUTOF10: 0
PAINLEVEL_OUTOF10: 0
PAINLEVEL_OUTOF10: 3
PAINLEVEL_OUTOF10: 3

## 2024-05-16 ASSESSMENT — PAIN DESCRIPTION - LOCATION: LOCATION: RECTUM

## 2024-05-16 ASSESSMENT — PAIN - FUNCTIONAL ASSESSMENT: PAIN_FUNCTIONAL_ASSESSMENT: 0-10

## 2024-05-16 ASSESSMENT — PAIN DESCRIPTION - DESCRIPTORS: DESCRIPTORS: DISCOMFORT

## 2024-05-16 ASSESSMENT — PAIN DESCRIPTION - ORIENTATION: ORIENTATION: INNER

## 2024-05-16 NOTE — PROGRESS NOTES
PACU Transfer Note    Vitals:    05/16/24 1155   BP: 103/73   Pulse: 70   Resp: 18   Temp: 98.2 °F (36.8 °C)   SpO2: 100%       In: 750 [P.O.:50; I.V.:700]  Out: 0     Pain assessment:  none, had Exparel in the OR  Pain Level: 0    Report given to Receiving unit Dolly GARCIA here at bedside in the PACU. Transferred with all belongings to ready room per stretcher, per PACU Leonora weeks. Family has been directed to room.    5/16/2024 12:03 PM

## 2024-05-16 NOTE — BRIEF OP NOTE
Brief Postoperative Note      Patient: Mulugeta Aldana  YOB: 1950  MRN: [id number]    Date of Procedure: 5/16/2024    Pre-Op Diagnosis Codes:     * Hemorrhoids [K64.9]    Post-Op Diagnosis: Same       Procedure(s):  HEMORRHOIDECTOMY X3    Surgeon(s):  Alex Thao MD    Assistant:  Resident: Jr Grossman DO    Anesthesia: General    Estimated Blood Loss (mL): 100 cc    Complications: None    Specimens:   ID Type Source Tests Collected by Time Destination   A : A. HEMORRHOIDS Tissue Tissue SURGICAL PATHOLOGY Alex Thao MD 5/16/2024 1018            Findings:  Infection Present At Time Of Surgery (PATOS) (choose all levels that have infection present):  No infection present      Electronically signed by Alex Thao MD on 5/16/2024 at 10:41 AM

## 2024-05-16 NOTE — DISCHARGE INSTRUCTIONS
DISCHARGE INSTRUCTIONS:  Your procedure: Hemorrhoidectomy     Activity: May walk and climb stairs. Avoid strenuous exercise and heavy lifting (greater than 10 pounds) as best as possible for two weeks. Do not drive while taking pain medication.     A small amount of bleeding is normal after anorectal surgery   Keep stools soft - Continue your current regimen of stool softeners. You were given a prescription for Colace. You may add Miralax as needed for soft stools. If you begin to experience diarrhea, please back down on the stool softeners.   Pain can be controlled with over the counter pain medication and narcotic pain medication.  Be careful, because narcotic medications can impair you and make you severely constipated, which can aggravate your wounds and incisions.  No driving or operating machinery if taking narcotics, or if you cannot safely maneuver the vehicle without pain.  You also have a small foam in your rectum called a Gel Foam. This will pass with your first bowel movement.      PLEASE RESTART YOUR ASPIRIN AND WARFARIN IN FOUR DAYS.     Call Surgeon if you have:  Temperature greater than 101 degrees  Persistent nausea and vomiting  Severe uncontrolled pain  Redness, tenderness, or signs of infection (pain, swelling, redness, odor or green/yellow discharge around the site)  Difficulty breathing, headache or visual disturbances  Hives  Persistent dizziness or light-headedness  Extreme fatigue  Any other questions or concerns you may have after discharge  Bleeding that does not stop. A small amount of spotting is expected    In an emergency, call 911 or go to an Emergency Department at a nearby hospital    It is important to bring a complete, current list of your medications to any medical appointments or hospitalizations.    REMINDER:   Carry a list of your medications and allergies with you at all times  Call your doctors office when running low on pain medication if you need a refill  Ice packs may be  applied for 10-15 minutes every hour to reduce pain and swelling  Warm water baths and Sitz baths will also help with pain   You may not have a bowel movement for several days after the operation    Diet: Resume your usual diet. Good nutrition promotes healing. Increase fluid intake.             Your Surgeon or Anesthesiologist gave you Exparel     Exparel (bupivicaine liposome injectable suspension) is a medication injected into the surgical incision  just before the end of the procedure by your surgeon to help control your pain after surgery.  It can also be given as a nerve block by the anesthesiologist. This local anesthetic provides pain relief by numbing the tissue around the surgical site.  Exparel releases pain medication over time lasting up to 5 days or 120 hours.  Exparel may cause a temporary loss of sensation or the ability to move in the area where the medication was injected.    Side effects of Exparel that may occur include nausea, vomiting or constipation.  Call immediately if you experience numbness or tingling of the mouth or lips, lightheadedness or severe anxiety.  Notify your physician if you experience these or any other possible side effects of the medication.    Note: Other forms of bupivacaine should not be administered within 96 hours (4 days) following administration of Exparel.  Please keep ID band in place for 96 hours (4 days).

## 2024-05-16 NOTE — H&P
Mulugeta Aldana    [id number]    [facility]  Department of General Surgery   Surgical Service   Pre-operative History and Physical    DIAGNOSIS:   Hemorrhoids [K64.9]    PROCEDURE:  FL HEMORRHOIDECTOMY NTRNL & XTRNL 1 COLUMN/GROUP [22443] (EXCISIONAL HEMORRHOIDECTOMY)     HISTORY OF PRESENT ILLNESS:    Presents for hemorrhoidectomy. No new changes since last office visit.      Past Medical History:        Diagnosis Date    Atrial fibrillation (HCC)     COPD (chronic obstructive pulmonary disease) (HCC)      Past Surgical History:    No past surgical history on file.  Past Social History:  Social History     Socioeconomic History    Marital status:    Tobacco Use    Smoking status: Every Day     Current packs/day: 1.00     Average packs/day: 1 pack/day for 58.4 years (58.4 ttl pk-yrs)     Types: Cigarettes     Start date: 1966    Smokeless tobacco: Never   Vaping Use    Vaping Use: Never used   Substance and Sexual Activity    Alcohol use: Not Currently     Comment: beer 4-5 x per week    Drug use: Never    Sexual activity: Defer         Medications Prior to Admission:      Prior to Admission medications    Medication Sig Start Date End Date Taking? Authorizing Provider   tamsulosin (FLOMAX) 0.4 MG capsule Take 1 capsule by mouth nightly 5/6/24   Ashley Novoa MD   guaiFENesin (MUCINEX) 600 MG extended release tablet Take 1 tablet by mouth    Ashley Novoa MD   hydrocortisone (ANUSOL-HC) 2.5 % CREA rectal cream APPLY AS DIRECTED THREE TIMES DAILY TO PERIANAL AREA AS NEEDED 3/27/24   Ashley Novoa MD   nitroGLYCERIN (NITROSTAT) 0.4 MG SL tablet Place 1 tablet under the tongue 12/14/22   Ashley Novoa MD   fluticasone furoate-vilanterol (BREO ELLIPTA) 200-25 MCG/ACT AEPB inhaler Inhale into the lungs    Ashley Novoa MD   Cholecalciferol 25 MCG (1000 UT) CHEW Take 1 tablet by mouth daily    Ashley Novoa MD   vitamin C (ASCORBIC  ACID) 500 MG tablet Take 1 tablet by mouth daily    Ashley Novoa MD   vitamin B-12 (CYANOCOBALAMIN) 1000 MCG tablet Take 1 tablet by mouth daily    Ashley Novoa MD   amLODIPine (NORVASC) 10 MG tablet Take 1 tablet by mouth daily    Ashley Novoa MD   metoprolol succinate (TOPROL XL) 25 MG extended release tablet Take 1 tablet by mouth daily 12/13/22   Ashley Novoa MD   warfarin (COUMADIN) 5 MG tablet Take 1 tablet by mouth daily    Ashley Novoa MD   Roflumilast (DALIRESP) 500 MCG tablet Take 1 tablet by mouth daily    Ashley Novoa MD   aspirin 81 MG EC tablet Take 1 tablet by mouth daily 12/13/22   Ashley Novoa MD   budesonide-formoterol (SYMBICORT) 160-4.5 MCG/ACT AERO Inhale 2 puffs into the lungs 2 times daily 2/8/22   Christian Cooney MD   tiotropium (SPIRIVA RESPIMAT) 2.5 MCG/ACT AERS inhaler Inhale 2 puffs into the lungs daily 2/8/22   Christian Cooney MD   Multiple Vitamin (MVI, CELEBRATE, CHEWABLE TABLET) Take 1 tablet by mouth daily 2/8/22   Christian Cooney MD   albuterol sulfate  (90 Base) MCG/ACT inhaler Inhale 2 puffs into the lungs every 4 hours as needed for Wheezing 2/6/22   Carlos Eduardo Figueroa MD   IRBESARTAN PO Take 150 mg by mouth daily    Ashley Novoa MD   omeprazole (PRILOSEC) 20 MG delayed release capsule Take 1 capsule by mouth daily    Ashley Novoa MD         Allergies:  Patient has no known allergies.    PHYSICAL EXAM:      There were no vitals taken for this visit.     Heart:  regular rate and rhythm    Lungs:  No increased work of breathing, good air exchange    Abdomen:  soft    ASSESSMENT AND PLAN:    1.  Patient seen and focused exam done today    2.  Access to ancillary services are available per request of the provider.    Jr Grossman DO  PGY1, General Surgery  05/16/24   7:08 AM   [facility]  [phone]

## 2024-05-16 NOTE — ANESTHESIA POSTPROCEDURE EVALUATION
Department of Anesthesiology  Postprocedure Note    Patient: Mulugeta Aldana  MRN: [id number]  YOB: 1950  Date of evaluation: 5/16/2024    Procedure Summary       Date: 05/16/24 Room / Location: [address]    Anesthesia Start: 0931 Anesthesia Stop: 1107    Procedure: HEMORRHOIDECTOMY X3 Diagnosis:       Hemorrhoids      (Hemorrhoids [K64.9])    Surgeons: Alex Thao MD Responsible Provider: Jose Arango MD    Anesthesia Type: general ASA Status: 2            Anesthesia Type: No value filed.    Hemanth Phase I: Hemanth Score: 9    Hemanth Phase II:      Anesthesia Post Evaluation    Patient location during evaluation: PACU  Patient participation: complete - patient participated  Level of consciousness: awake  Airway patency: patent  Nausea & Vomiting: no nausea and no vomiting  Cardiovascular status: blood pressure returned to baseline and hemodynamically stable  Respiratory status: acceptable  Hydration status: euvolemic  Multimodal analgesia pain management approach  Pain management: adequate    No notable events documented.

## 2024-05-16 NOTE — ANESTHESIA PRE PROCEDURE
Department of Anesthesiology  Preprocedure Note       Name:  Mulugeta Aldana   Age:  74 y.o.  :  1950                                          MRN:  [id number]         Date:  2024      Surgeon: Surgeon(s):  Alex Thao MD    Procedure: Procedure(s):  EXCISIONAL HEMORRHOIDECTOMY    Medications prior to admission:   Prior to Admission medications    Medication Sig Start Date End Date Taking? Authorizing Provider   tamsulosin (FLOMAX) 0.4 MG capsule Take 1 capsule by mouth nightly 24   Ashley Novoa MD   guaiFENesin (MUCINEX) 600 MG extended release tablet Take 1 tablet by mouth  Patient not taking: Reported on 2024    Ashley Novoa MD   hydrocortisone (ANUSOL-HC) 2.5 % CREA rectal cream APPLY AS DIRECTED THREE TIMES DAILY TO PERIANAL AREA AS NEEDED 3/27/24   Ashley Novoa MD   nitroGLYCERIN (NITROSTAT) 0.4 MG SL tablet Place 1 tablet under the tongue 22   Ashley Novoa MD   fluticasone furoate-vilanterol (BREO ELLIPTA) 200-25 MCG/ACT AEPB inhaler Inhale into the lungs  Patient not taking: Reported on 2024    Ashley Novoa MD   Cholecalciferol 25 MCG (1000 UT) CHEW Take 1 tablet by mouth daily    Ashley Novoa MD   vitamin C (ASCORBIC ACID) 500 MG tablet Take 1 tablet by mouth daily    Ashley Novoa MD   vitamin B-12 (CYANOCOBALAMIN) 1000 MCG tablet Take 1 tablet by mouth daily    Ashley Novoa MD   amLODIPine (NORVASC) 10 MG tablet Take 1 tablet by mouth daily    Ashley Novao MD   metoprolol succinate (TOPROL XL) 25 MG extended release tablet Take 1 tablet by mouth daily 22   Ashley Novoa MD   warfarin (COUMADIN) 5 MG tablet Take 1 tablet by mouth daily    Ashley Novoa MD   Roflumilast (DALIRESP) 500 MCG tablet Take 1 tablet by mouth daily    Ashley Novoa MD   aspirin 81 MG EC tablet Take 1 tablet by mouth daily 22   Ashley Novoa MD   budesonide-formoterol

## 2024-05-16 NOTE — FLOWSHEET NOTE
Patient received from the OR to PACU #9 post HEMORRHOIDECTOMY X3 of Dr. Thao. Placed on PACU monitoring equipment. Report given per cRNA, OR RN and Dr. Grossman. Per report, patient was treated for low BP intra op, has history of afib and went into afib intra op. Will be admitted for bleeding potential. On arrival, patient is arouseable, hardy and denies pain. Restless on stretcher.

## 2024-05-16 NOTE — OP NOTE
Operative Note      Patient: Mulugeta Aldana  YOB: 1950  MRN: [id number]    Date of Procedure: 5/16/2024    Pre-Op Diagnosis Codes:     * Hemorrhoids [K64.9]    Post-Op Diagnosis: Same       Procedure(s):  HEMORRHOIDECTOMY X3    Surgeon(s):  Alex Thao MD    Assistant:   Resident: Jr Grossman DO    Anesthesia: General    Estimated Blood Loss (mL): 100 cc    Complications: None    Specimens:   ID Type Source Tests Collected by Time Destination   A : A. HEMORRHOIDS Tissue Tissue SURGICAL PATHOLOGY Alex Thao MD 5/16/2024 1018            Findings:  Infection Present At Time Of Surgery (PATOS) (choose all levels that have infection present):  No infection present      Detailed Description of Procedure:     After informed consent was obtained the patient was taken to the operating room. General anesthesia was given. Time out was called to confirm key components. The patient was placed in the lithotomy position with appropriate padding. The patient was then prepped and draped in the usual sterile fashion.    We saw several large hemorrhoids hanging out. These had thickening and whitening of the mucosa consistent with chronic prolapse. We placed a hemorrhoid clamp on the left lateral lesion and elevated it away from the underlying structures. We then placed a 3-0 chromic suture at the internal apex of the hemorrhoid. We then divided the mucosa using a 15 scalpel blade. We divided the rest of the hemorrhoid with Ligasure. The 3-0 chromic was then used in a running locking fashion to close the defect with care taken to approximate mucosa to mucosa and skin to skin. There was slight oozing from the distal aspect of the wound that was controlled with a second chromic stitch. Two right sided hemorrhoids were removed in similar fashion.     A small amount of redundant skin was excised for better cosmetic effect and the distal aspect the incision left slightly open for drainage. Hemostasis was

## 2024-05-19 NOTE — DISCHARGE SUMMARY
Inhale 2 puffs into the lungs daily, Disp-1 each, R-0Normal      Multiple Vitamin (MVI, CELEBRATE, CHEWABLE TABLET) Take 1 tablet by mouth daily, Disp-30 tablet, R-0Normal      albuterol sulfate  (90 Base) MCG/ACT inhaler Inhale 2 puffs into the lungs every 4 hours as needed for Wheezing, Disp-18 g, R-0Normal      IRBESARTAN PO Take 150 mg by mouth dailyHistorical Med      omeprazole (PRILOSEC) 20 MG delayed release capsule Take 1 capsule by mouth dailyHistorical Med           Activity: no heavy lifting for 4 weeks  Diet: regular diet  Wound Care: as directed    Follow-up with Dr. Thao in 3 weeks.    Signed:  Jr Grossman DO  PGY1, General Surgery  05/19/24   6:47 PM   [facility]  [phone]

## 2024-06-07 ENCOUNTER — OFFICE VISIT (OUTPATIENT)
Dept: SURGERY | Age: 74
End: 2024-06-07

## 2024-06-07 VITALS
RESPIRATION RATE: 16 BRPM | TEMPERATURE: 97.5 F | SYSTOLIC BLOOD PRESSURE: 119 MMHG | WEIGHT: 202 LBS | BODY MASS INDEX: 26.65 KG/M2 | OXYGEN SATURATION: 96 % | HEART RATE: 89 BPM | DIASTOLIC BLOOD PRESSURE: 77 MMHG

## 2024-06-07 DIAGNOSIS — K64.9 HEMORRHOIDS, UNSPECIFIED HEMORRHOID TYPE: Primary | ICD-10-CM

## 2024-06-07 PROCEDURE — 99024 POSTOP FOLLOW-UP VISIT: CPT | Performed by: SURGERY

## 2024-06-07 RX ORDER — OXYCODONE HYDROCHLORIDE AND ACETAMINOPHEN 5; 325 MG/1; MG/1
1 TABLET ORAL EVERY 8 HOURS PRN
Qty: 21 TABLET | Refills: 0 | Status: SHIPPED | OUTPATIENT
Start: 2024-06-07 | End: 2024-06-14

## 2024-06-07 RX ORDER — TERBINAFINE HYDROCHLORIDE 250 MG/1
250 TABLET ORAL DAILY
Qty: 7 TABLET | Refills: 0 | Status: SHIPPED | OUTPATIENT
Start: 2024-06-07 | End: 2024-06-14

## 2024-06-07 NOTE — PROGRESS NOTES
Subjective:       Mulugeta Aldana presents to the clinic 2-3 weeks after hemorrhoid removal    HPI: Doing well. Decrease pain and swelling.      Objective:      /77   Pulse 89   Temp 97.5 °F (36.4 °C) (Infrared)   Resp 16   Wt 91.6 kg (202 lb)   SpO2 96%   BMI 26.65 kg/m²     GEN: appears well, no distress, appears stated age  PSYCH: normal mood, normal affect  NECK: no neck masses, trachea midline  ENT: moist oral mucosa; anicteric  SKIN: no rash or jaundice  CV: regular heart rate and rhythm  PULM: normal respiratory effort, no wheezing  GI: soft non tender abdomen. Normal bowel sounds  RECTAL: examined with chaperone Lisa Landrum RN. Incisions healing minimal swelling. Tinea cruris noted.   EXT/NEURO: normal gait, strength/sensation grossly intact in all extremities       Assessment:      Doing well postoperatively.      Plan:      1. Continue any current medications.  2. Wound care discussed.  3. Path = hemorrhoids  4. OK for additional pain med  5. Start oral anti fungal   6. See me 6 weeks    Alex Thao M.D.  6/7/24   11:02 AM

## 2024-07-19 ENCOUNTER — OFFICE VISIT (OUTPATIENT)
Dept: SURGERY | Age: 74
End: 2024-07-19

## 2024-07-19 VITALS
RESPIRATION RATE: 16 BRPM | WEIGHT: 202 LBS | SYSTOLIC BLOOD PRESSURE: 121 MMHG | HEIGHT: 73 IN | BODY MASS INDEX: 26.77 KG/M2 | DIASTOLIC BLOOD PRESSURE: 89 MMHG | HEART RATE: 83 BPM

## 2024-07-19 DIAGNOSIS — K64.9 HEMORRHOIDS, UNSPECIFIED HEMORRHOID TYPE: Primary | ICD-10-CM

## 2024-07-19 NOTE — PROGRESS NOTES
Subjective:       Mulugeta Aldana presents to the clinic 6 weeks since last seen after hemorrhoidectomy    HPI: Doing well. No pain or bleeding. Slight sensation of prolapsing hemorrhoid on one side that goes back on its own      Objective:      /89 (Site: Left Upper Arm, Position: Sitting, Cuff Size: Medium Adult)   Pulse 83   Resp 16   Ht 1.854 m (6' 1\")   Wt 91.6 kg (202 lb)   BMI 26.65 kg/m²     General:  alert, appears stated age, and cooperative   Abdomen: soft, bowel sounds active, non-tender   Incision:   healing well, no drainage, no erythema, mild swelling. Exam with Karly Mayo RN present       Assessment:      Doing well postoperatively.      Plan:      1. Continue any current medications.  2. Wound care discussed.  3. See me as needed    Alex Thao M.D.  7/19/24   11:02 AM

## (undated) DEVICE — GOWN,SIRUS,POLYRNF,BRTHSLV,XL,30/CS: Brand: MEDLINE

## (undated) DEVICE — POSITIONER HD REST FOAM CMFRT TCH

## (undated) DEVICE — RECTAL: Brand: MEDLINE INDUSTRIES, INC.

## (undated) DEVICE — GLOVE SURG SZ 85 L12IN FNGR THK79MIL GRN LTX FREE

## (undated) DEVICE — SUTURE CHROMIC GUT SZ 3-0 L27IN ABSRB BRN L26MM SH 1/2 CIR G122H

## (undated) DEVICE — TOWEL,STOP FLAG GOLD N-W: Brand: MEDLINE

## (undated) DEVICE — GLOVE ORANGE PI 8   MSG9080

## (undated) DEVICE — PACK LAP IV REINF TBL CVR ADH UTIL UNDERBUTTOCK DRP W CUF

## (undated) DEVICE — SURGIFOAM SPNG SZ 100